# Patient Record
Sex: MALE | Race: OTHER | URBAN - METROPOLITAN AREA
[De-identification: names, ages, dates, MRNs, and addresses within clinical notes are randomized per-mention and may not be internally consistent; named-entity substitution may affect disease eponyms.]

---

## 2024-09-23 ENCOUNTER — APPOINTMENT (OUTPATIENT)
Dept: VASCULAR LAB | Age: 44
DRG: 549 | End: 2024-09-23

## 2024-09-23 ENCOUNTER — APPOINTMENT (OUTPATIENT)
Dept: GENERAL RADIOLOGY | Age: 44
DRG: 549 | End: 2024-09-23

## 2024-09-23 ENCOUNTER — HOSPITAL ENCOUNTER (INPATIENT)
Age: 44
LOS: 4 days | Discharge: HOME | DRG: 549 | End: 2024-09-27
Attending: EMERGENCY MEDICINE | Admitting: FAMILY MEDICINE

## 2024-09-23 DIAGNOSIS — F10.939 ALCOHOL WITHDRAWAL SYNDROME WITH COMPLICATION (HCC): ICD-10-CM

## 2024-09-23 DIAGNOSIS — M00.9 PYOGENIC ARTHRITIS OF LEFT KNEE JOINT, DUE TO UNSPECIFIED ORGANISM: Primary | ICD-10-CM

## 2024-09-23 PROBLEM — R03.0 ELEVATED BLOOD PRESSURE READING: Status: ACTIVE | Noted: 2024-09-23

## 2024-09-23 LAB
ANION GAP SERPL CALCULATED.3IONS-SCNC: 18 MMOL/L (ref 9–16)
BUN SERPL-MCNC: 14 MG/DL (ref 6–20)
CALCIUM SERPL-MCNC: 9.4 MG/DL (ref 8.6–10.4)
CHLORIDE SERPL-SCNC: 92 MMOL/L (ref 98–107)
CO2 SERPL-SCNC: 23 MMOL/L (ref 20–31)
CREAT SERPL-MCNC: 1.1 MG/DL (ref 0.7–1.2)
CRP SERPL HS-MCNC: 271 MG/L (ref 0–5)
D DIMER PPP FEU-MCNC: 6.43 UG/ML FEU (ref 0–0.57)
ERYTHROCYTE [DISTWIDTH] IN BLOOD BY AUTOMATED COUNT: 16.5 % (ref 11.8–14.4)
EST. AVERAGE GLUCOSE BLD GHB EST-MCNC: 128 MG/DL
GFR, ESTIMATED: 83 ML/MIN/1.73M2
GLUCOSE BLD-MCNC: 159 MG/DL (ref 75–110)
GLUCOSE BLD-MCNC: 172 MG/DL (ref 75–110)
GLUCOSE SERPL-MCNC: 199 MG/DL (ref 74–99)
HBA1C MFR BLD: 6.1 % (ref 4–6)
HCT VFR BLD AUTO: 41.2 % (ref 40.7–50.3)
HGB BLD-MCNC: 14.3 G/DL (ref 13–17)
LACTIC ACID, SEPSIS WHOLE BLOOD: 2.7 MMOL/L (ref 0.5–1.9)
MCH RBC QN AUTO: 32.1 PG (ref 25.2–33.5)
MCHC RBC AUTO-ENTMCNC: 34.7 G/DL (ref 28.4–34.8)
MCV RBC AUTO: 92.4 FL (ref 82.6–102.9)
NRBC BLD-RTO: 0 PER 100 WBC
PLATELET # BLD AUTO: 354 K/UL (ref 138–453)
PMV BLD AUTO: 9.7 FL (ref 8.1–13.5)
POTASSIUM SERPL-SCNC: 3.6 MMOL/L (ref 3.7–5.3)
RBC # BLD AUTO: 4.46 M/UL (ref 4.21–5.77)
SODIUM SERPL-SCNC: 133 MMOL/L (ref 136–145)
WBC OTHER # BLD: 15.4 K/UL (ref 3.5–11.3)

## 2024-09-23 PROCEDURE — 87186 SC STD MICRODIL/AGAR DIL: CPT

## 2024-09-23 PROCEDURE — 87077 CULTURE AEROBIC IDENTIFY: CPT

## 2024-09-23 PROCEDURE — 87205 SMEAR GRAM STAIN: CPT

## 2024-09-23 PROCEDURE — 89051 BODY FLUID CELL COUNT: CPT

## 2024-09-23 PROCEDURE — 99285 EMERGENCY DEPT VISIT HI MDM: CPT

## 2024-09-23 PROCEDURE — 85027 COMPLETE CBC AUTOMATED: CPT

## 2024-09-23 PROCEDURE — 89060 EXAM SYNOVIAL FLUID CRYSTALS: CPT

## 2024-09-23 PROCEDURE — 99222 1ST HOSP IP/OBS MODERATE 55: CPT | Performed by: STUDENT IN AN ORGANIZED HEALTH CARE EDUCATION/TRAINING PROGRAM

## 2024-09-23 PROCEDURE — 86140 C-REACTIVE PROTEIN: CPT

## 2024-09-23 PROCEDURE — 96365 THER/PROPH/DIAG IV INF INIT: CPT

## 2024-09-23 PROCEDURE — 83605 ASSAY OF LACTIC ACID: CPT

## 2024-09-23 PROCEDURE — 2580000003 HC RX 258: Performed by: STUDENT IN AN ORGANIZED HEALTH CARE EDUCATION/TRAINING PROGRAM

## 2024-09-23 PROCEDURE — 93005 ELECTROCARDIOGRAM TRACING: CPT | Performed by: FAMILY MEDICINE

## 2024-09-23 PROCEDURE — 82947 ASSAY GLUCOSE BLOOD QUANT: CPT

## 2024-09-23 PROCEDURE — 87591 N.GONORRHOEAE DNA AMP PROB: CPT

## 2024-09-23 PROCEDURE — 73562 X-RAY EXAM OF KNEE 3: CPT

## 2024-09-23 PROCEDURE — 85379 FIBRIN DEGRADATION QUANT: CPT

## 2024-09-23 PROCEDURE — 2500000003 HC RX 250 WO HCPCS

## 2024-09-23 PROCEDURE — 80048 BASIC METABOLIC PNL TOTAL CA: CPT

## 2024-09-23 PROCEDURE — 87070 CULTURE OTHR SPECIMN AEROBIC: CPT

## 2024-09-23 PROCEDURE — 1200000000 HC SEMI PRIVATE

## 2024-09-23 PROCEDURE — 6370000000 HC RX 637 (ALT 250 FOR IP): Performed by: STUDENT IN AN ORGANIZED HEALTH CARE EDUCATION/TRAINING PROGRAM

## 2024-09-23 PROCEDURE — 83036 HEMOGLOBIN GLYCOSYLATED A1C: CPT

## 2024-09-23 PROCEDURE — 87075 CULTR BACTERIA EXCEPT BLOOD: CPT

## 2024-09-23 PROCEDURE — 2580000003 HC RX 258

## 2024-09-23 PROCEDURE — 87491 CHLMYD TRACH DNA AMP PROBE: CPT

## 2024-09-23 PROCEDURE — 6360000002 HC RX W HCPCS

## 2024-09-23 PROCEDURE — 93970 EXTREMITY STUDY: CPT

## 2024-09-23 PROCEDURE — 96375 TX/PRO/DX INJ NEW DRUG ADDON: CPT

## 2024-09-23 PROCEDURE — 6360000002 HC RX W HCPCS: Performed by: STUDENT IN AN ORGANIZED HEALTH CARE EDUCATION/TRAINING PROGRAM

## 2024-09-23 RX ORDER — MAGNESIUM SULFATE IN WATER 40 MG/ML
2000 INJECTION, SOLUTION INTRAVENOUS PRN
Status: DISCONTINUED | OUTPATIENT
Start: 2024-09-23 | End: 2024-09-27 | Stop reason: HOSPADM

## 2024-09-23 RX ORDER — ENOXAPARIN SODIUM 100 MG/ML
30 INJECTION SUBCUTANEOUS 2 TIMES DAILY
Status: DISCONTINUED | OUTPATIENT
Start: 2024-09-23 | End: 2024-09-27 | Stop reason: HOSPADM

## 2024-09-23 RX ORDER — POTASSIUM CHLORIDE 7.45 MG/ML
10 INJECTION INTRAVENOUS PRN
Status: DISCONTINUED | OUTPATIENT
Start: 2024-09-23 | End: 2024-09-27 | Stop reason: HOSPADM

## 2024-09-23 RX ORDER — MORPHINE SULFATE 4 MG/ML
2 INJECTION INTRAVENOUS EVERY 4 HOURS PRN
Status: DISCONTINUED | OUTPATIENT
Start: 2024-09-23 | End: 2024-09-25

## 2024-09-23 RX ORDER — 0.9 % SODIUM CHLORIDE 0.9 %
30 INTRAVENOUS SOLUTION INTRAVENOUS ONCE
Status: COMPLETED | OUTPATIENT
Start: 2024-09-23 | End: 2024-09-23

## 2024-09-23 RX ORDER — POTASSIUM CHLORIDE 1500 MG/1
40 TABLET, EXTENDED RELEASE ORAL PRN
Status: DISCONTINUED | OUTPATIENT
Start: 2024-09-23 | End: 2024-09-27 | Stop reason: HOSPADM

## 2024-09-23 RX ORDER — SODIUM CHLORIDE 9 MG/ML
INJECTION, SOLUTION INTRAVENOUS CONTINUOUS
Status: ACTIVE | OUTPATIENT
Start: 2024-09-23 | End: 2024-09-25

## 2024-09-23 RX ORDER — POTASSIUM CHLORIDE 1500 MG/1
40 TABLET, EXTENDED RELEASE ORAL ONCE
Status: COMPLETED | OUTPATIENT
Start: 2024-09-23 | End: 2024-09-23

## 2024-09-23 RX ORDER — MORPHINE SULFATE 4 MG/ML
4 INJECTION INTRAVENOUS ONCE
Status: COMPLETED | OUTPATIENT
Start: 2024-09-23 | End: 2024-09-23

## 2024-09-23 RX ORDER — FENTANYL CITRATE 50 UG/ML
50 INJECTION, SOLUTION INTRAMUSCULAR; INTRAVENOUS ONCE
Status: COMPLETED | OUTPATIENT
Start: 2024-09-23 | End: 2024-09-23

## 2024-09-23 RX ORDER — PANTOPRAZOLE SODIUM 40 MG/1
40 TABLET, DELAYED RELEASE ORAL
Status: DISCONTINUED | OUTPATIENT
Start: 2024-09-24 | End: 2024-09-27 | Stop reason: HOSPADM

## 2024-09-23 RX ORDER — LIDOCAINE HYDROCHLORIDE AND EPINEPHRINE 10; 10 MG/ML; UG/ML
20 INJECTION, SOLUTION INFILTRATION; PERINEURAL ONCE
Status: COMPLETED | OUTPATIENT
Start: 2024-09-23 | End: 2024-09-23

## 2024-09-23 RX ORDER — SODIUM CHLORIDE 0.9 % (FLUSH) 0.9 %
5-40 SYRINGE (ML) INJECTION EVERY 12 HOURS SCHEDULED
Status: DISCONTINUED | OUTPATIENT
Start: 2024-09-23 | End: 2024-09-27 | Stop reason: HOSPADM

## 2024-09-23 RX ORDER — INSULIN LISPRO 100 [IU]/ML
0-8 INJECTION, SOLUTION INTRAVENOUS; SUBCUTANEOUS
Status: DISCONTINUED | OUTPATIENT
Start: 2024-09-23 | End: 2024-09-27 | Stop reason: HOSPADM

## 2024-09-23 RX ORDER — ONDANSETRON 2 MG/ML
4 INJECTION INTRAMUSCULAR; INTRAVENOUS EVERY 6 HOURS PRN
Status: DISCONTINUED | OUTPATIENT
Start: 2024-09-23 | End: 2024-09-27 | Stop reason: HOSPADM

## 2024-09-23 RX ORDER — ACETAMINOPHEN 325 MG/1
650 TABLET ORAL EVERY 6 HOURS PRN
Status: DISCONTINUED | OUTPATIENT
Start: 2024-09-23 | End: 2024-09-27 | Stop reason: HOSPADM

## 2024-09-23 RX ORDER — ACETAMINOPHEN 650 MG/1
650 SUPPOSITORY RECTAL EVERY 6 HOURS PRN
Status: DISCONTINUED | OUTPATIENT
Start: 2024-09-23 | End: 2024-09-27 | Stop reason: HOSPADM

## 2024-09-23 RX ORDER — INSULIN LISPRO 100 [IU]/ML
0-4 INJECTION, SOLUTION INTRAVENOUS; SUBCUTANEOUS NIGHTLY
Status: DISCONTINUED | OUTPATIENT
Start: 2024-09-23 | End: 2024-09-27 | Stop reason: HOSPADM

## 2024-09-23 RX ORDER — POLYETHYLENE GLYCOL 3350 17 G/17G
17 POWDER, FOR SOLUTION ORAL DAILY PRN
Status: DISCONTINUED | OUTPATIENT
Start: 2024-09-23 | End: 2024-09-27 | Stop reason: HOSPADM

## 2024-09-23 RX ORDER — ONDANSETRON 4 MG/1
4 TABLET, ORALLY DISINTEGRATING ORAL EVERY 8 HOURS PRN
Status: DISCONTINUED | OUTPATIENT
Start: 2024-09-23 | End: 2024-09-27 | Stop reason: HOSPADM

## 2024-09-23 RX ORDER — SODIUM CHLORIDE 9 MG/ML
INJECTION, SOLUTION INTRAVENOUS PRN
Status: DISCONTINUED | OUTPATIENT
Start: 2024-09-23 | End: 2024-09-27 | Stop reason: HOSPADM

## 2024-09-23 RX ORDER — KETOROLAC TROMETHAMINE 15 MG/ML
15 INJECTION, SOLUTION INTRAMUSCULAR; INTRAVENOUS ONCE
Status: COMPLETED | OUTPATIENT
Start: 2024-09-23 | End: 2024-09-23

## 2024-09-23 RX ORDER — SODIUM CHLORIDE 0.9 % (FLUSH) 0.9 %
5-40 SYRINGE (ML) INJECTION PRN
Status: DISCONTINUED | OUTPATIENT
Start: 2024-09-23 | End: 2024-09-27 | Stop reason: HOSPADM

## 2024-09-23 RX ADMIN — POTASSIUM CHLORIDE 40 MEQ: 1500 TABLET, EXTENDED RELEASE ORAL at 14:40

## 2024-09-23 RX ADMIN — LIDOCAINE HYDROCHLORIDE AND EPINEPHRINE 20 ML: 10; 10 INJECTION, SOLUTION INFILTRATION; PERINEURAL at 11:39

## 2024-09-23 RX ADMIN — SODIUM CHLORIDE: 9 INJECTION, SOLUTION INTRAVENOUS at 15:05

## 2024-09-23 RX ADMIN — SODIUM CHLORIDE 3402 ML: 9 INJECTION, SOLUTION INTRAVENOUS at 12:01

## 2024-09-23 RX ADMIN — FENTANYL CITRATE 50 MCG: 50 INJECTION, SOLUTION INTRAMUSCULAR; INTRAVENOUS at 11:24

## 2024-09-23 RX ADMIN — SODIUM CHLORIDE: 9 INJECTION, SOLUTION INTRAVENOUS at 20:48

## 2024-09-23 RX ADMIN — KETOROLAC TROMETHAMINE 15 MG: 15 INJECTION, SOLUTION INTRAMUSCULAR; INTRAVENOUS at 11:24

## 2024-09-23 RX ADMIN — CEFTRIAXONE SODIUM 1000 MG: 1 INJECTION, POWDER, FOR SOLUTION INTRAMUSCULAR; INTRAVENOUS at 12:00

## 2024-09-23 RX ADMIN — MORPHINE SULFATE 4 MG: 4 INJECTION INTRAVENOUS at 13:13

## 2024-09-23 RX ADMIN — MORPHINE SULFATE 2 MG: 4 INJECTION INTRAVENOUS at 16:29

## 2024-09-23 RX ADMIN — SODIUM CHLORIDE 1500 MG: 9 INJECTION, SOLUTION INTRAVENOUS at 13:13

## 2024-09-23 ASSESSMENT — PAIN SCALES - GENERAL
PAINLEVEL_OUTOF10: 10
PAINLEVEL_OUTOF10: 0
PAINLEVEL_OUTOF10: 7
PAINLEVEL_OUTOF10: 10

## 2024-09-23 ASSESSMENT — PAIN DESCRIPTION - DESCRIPTORS: DESCRIPTORS: DISCOMFORT;NAGGING;DULL

## 2024-09-23 ASSESSMENT — PAIN DESCRIPTION - ORIENTATION: ORIENTATION: LEFT

## 2024-09-23 ASSESSMENT — ENCOUNTER SYMPTOMS
RESPIRATORY NEGATIVE: 1
ABDOMINAL PAIN: 1
NAUSEA: 1
VOMITING: 1
DIARRHEA: 1

## 2024-09-23 ASSESSMENT — PAIN DESCRIPTION - LOCATION: LOCATION: KNEE

## 2024-09-23 NOTE — ED PROVIDER NOTES
[facility] ED     Emergency Department     Faculty Attestation        I performed a history and physical examination of the patient and discussed management with the resident. I reviewed the resident’s note and agree with the documented findings and plan of care. Any areas of disagreement are noted on the chart. I was personally present for the key portions of any procedures. I have documented in the chart those procedures where I was not present during the key portions. I have reviewed the emergency nurses triage note. I agree with the chief complaint, past medical history, past surgical history, allergies, medications, social and family history as documented unless otherwise noted below.    For mid-level providers such as nurse practitioners as well as physicians assistants:    I have personally seen and evaluated the patient.    I find the patient's history and physical exam are consistent with NP/PA documentation.  I agree with the care provided, treatment rendered, disposition, & follow-up plan.     Additional findings are as noted.    Vital Signs: Pulse (!) 105   Temp 98.8 °F (37.1 °C)   Resp 29   Wt 113.4 kg (250 lb)   SpO2 98%   PCP:  No primary care provider on file.    Pertinent Comments:     Patient is a fevers and chills for the past couple days with left knee pain.  He denies any falls or trauma to the left knee does have a history of gout but states he is never had it in his knee.  He is afebrile and nontoxic here but his left knee is diffusely swollen and warm with limited range of motion.  Concern for possible septic joint obtain labs, arthrocentesis, pain control, reassessment      Critical Care  None          Don Morris MD    Attending Emergency Medicine Physician            Luis Morris MD  09/23/24 [id number]

## 2024-09-23 NOTE — ED NOTES
Patient presents to the ED via EMS. Patient states that he ate a chinese buffet on Friday and has been experiencing crippling leg pain since. Per patient the pain has kept him bed bound since Friday.  Patient is in NAD, respirations are even and unlabored, bed is in lowest position and call light within reach. Patient was placed on bedside monitor, IV established, and EKG completed. Resident is bedside for evaluation. Writer will continue with plan of care.

## 2024-09-23 NOTE — CONSULTS
Orthopaedic Surgery Consult  (Dr. Hodgson)      CC/Reason for consult: left septic knee arthritis    HPI:      The patient is a 44 y.o. right hand-dominant male who presents emergency department with severe left knee pain.  Patient states left knee pain started on Friday became progressively worse on Saturday to the point of being unable to walk Saturday night.  All day Sunday he remained off of the knee however continued to have progressive pain.  Due to the significant pain Monday morning he came to the emergency department for further evaluation.  Upon evaluation by the emergency department there was a notable effusion which was aspirated and sent for culture.  On gross examination of the synovial fluid sample GPC P was noted.  Cell count differential demonstrated 203k white blood cells.  Orthopedic surgery was consulted for further recommendations.    Patient seen and evaluated emergency department resting comfortably with family in the room.  He states all of his pain is located in his left knee.  He denies prior injuries to the left knee or open wounds about his body.  Denies prior illnesses.  States that he has had fevers and chills over the past 2 days.  Denies other joint pain at this time.  At baseline patient is a community ambulator.  He is currently unemployed.    Past Medical History:    No past medical history on file.  Past Surgical History:    No past surgical history on file.  Medications Prior to Admission:   Prior to Admission medications    Not on File     Allergies:    Patient has no known allergies.  Social History:   Social History     Socioeconomic History    Marital status: Unknown     Social Determinants of Health      Received from [facility]     Family History:  No family history on file.    ROS:   Constitutional: Positive for fever and chills.   Respiratory: Negative for cough.    Cardiovascular: Negative for chest pain.   Musculoskeletal:  Positive for arthralgias myalgias left knee.   Skin: Negative for itching and rash.   Neurological: Negative for numbness, tingling, weakness.     PE:  Blood pressure (!) 155/90, pulse 78, temperature 98.5 °F (36.9 °C), temperature source Oral, resp. rate 20, height 1.829 m (6'), weight 113.4 kg (250 lb), SpO2 99%.    Gen: Alert and mildly confused however redirectable, NAD, cooperative.     Head: Normocephalic, atraumatic.    Cardiovascular: Regular rate.    Respiratory: Chest symmetric, no accessory muscle use.    LLE: Skin intact.  Significant effusion noted about the left knee.  Knee resting in slightly flexed position.  Severe pain with any amount of palpation or movement about the left knee.  Compartments soft and easily compressible. EHL/FHL/TA/GS complex motor intact. Sural/saphenous/SPN/DPN/plantar nerve distribution SILT. Patient has no groin pain with log roll maneuver.  Foot is warm and well-perfused with BCR.    Labs:  Recent Labs     09/23/24  0930   WBC 15.4*   HGB 14.3   HCT 41.2      *   K 3.6*   BUN 14   CREATININE 1.1   GLUCOSE 199*   .0*        Imaging:   Multiple radiographic views of the left knee AP, oblique, lateral demonstrating a significant intra-articular effusion.  No evidence of acute fracture or dislocation.    Assessment: 44 y.o. male being seen for:    -Left knee septic arthritis      Plan:      -Plan for OR 9/24 with Dr. Hodgson for left knee irrigation debridement   -WB status: Weightbearing as tolerated left lower extremity  -Diet: Patient will be n.p.o. on 9/24 at 0015  -abx/tetanus: Currently on vancomycin, cultures pending from synovial fluid analysis  -consent/marked  -Pre-op labs  -F/u CRP, ESR  -F/u VitD level  -Pain control per primary team discretion  -Ice and elevate extremity for pain and swelling  -Please contact ortho with any questions      Robert Cruz MD  Orthopedic Surgery Resident, PGY-3  [facility],  [place]  6:32 PM 9/23/2024    This note is created with the assistance of a speech recognition program. While intending to generate a document that actually reflects the content of the visit, the document can still have some errors including those of syntax and sound a like substitutions which may escape proof reading.  In such instances, actual meaning can be extrapolated by contextual diversion.

## 2024-09-23 NOTE — H&P
[email or web address]    [facility]   IN-PATIENT SERVICE   [facility]    HISTORY AND PHYSICAL EXAMINATION            Date:   9/23/2024  Patient name:  Elizabet Wolff  Date of admission:  9/23/2024  9:19 AM  MRN:   [id number]  Account:  [id number]  YOB: 1980  PCP:    No primary care provider on file.  Room:   [facility]  Code Status:    Full Code    Chief Complaint:     Chief Complaint   Patient presents with    Leg Pain       History Obtained From:     patient, electronic medical record    History of Present Illness:     Elizabet Wolff is a 44 y.o. Unavailable / unknown male who presents with Leg Pain   and is admitted to the hospital for the management of Septic arthritis (HCC).    44-year-old male with history of diabetes mellitus not on any medication at home, came in with left knee pain and swelling going on for 3 days , fever, chills, GI symptoms, patient states that he came to the hospital because he thought he had food poisoning because since Saturday he has been having abdominal discomfort, vomiting, diarrhea however he did notice the left knee swelling/pain which worsened and now has been having difficulty with ambulation , currently his GI symptoms have resolved completely, found to be afebrile on arrival although tachycardic and tachypneic on arrival, labs showed elevated WBC, lactic acid count, CRP, D-dimer, elevated glucose levels  pending lower extremity Doppler studies,  Status post aspiration of left knee joint, labs sent from the ER, orthopedic surgery was consulted as well, patient received dose of antibiotic in ER      Past Medical History:     No past medical history on file.     Past Surgical History:     No past surgical history on file.     Medications Prior to Admission:     Prior to Admission medications    Not on File        Allergies:     Patient has no known allergies.    Social History:     Tobacco:    has no history on file for tobacco use.  Alcohol:

## 2024-09-23 NOTE — PROGRESS NOTES
[facility]   Pharmacy Pharmacokinetic Monitoring Service - Vancomycin     Elizabet Wolff is a 44 y.o. male starting on vancomycin therapy for bone/joint infection. Pharmacy consulted by Dr Mari for monitoring and adjustment.    Target Concentration: Goal AUC/XOCHILT 400-600 mg*hr/L    Additional Antimicrobials: ceftriaxone    Pertinent Laboratory Values:   Wt Readings from Last 1 Encounters:   09/23/24 113.4 kg (250 lb)     Temp Readings from Last 1 Encounters:   09/23/24 98.8 °F (37.1 °C)     Estimated Creatinine Clearance: 110 mL/min (based on SCr of 1.1 mg/dL).  Recent Labs     09/23/24  0930   CREATININE 1.1   BUN 14   WBC 15.4*     Procalcitonin: na    Pertinent Cultures:  Culture Date Source Results   9/23 Synovial fluid pending   MRSA Nasal Swab: N/A. Non-respiratory infection.    Plan:  Dosing recommendations based on Bayesian software  Start vancomycin 1500 mg IVPB q12h  Anticipated AUC of 490 and trough concentration of 14 at steady state  Renal labs as indicated   Vancomycin concentration not ordered  Pharmacy will continue to monitor patient and adjust therapy as indicated    Thank you for the consult,  Barbie Mckeon RPH  9/23/2024 12:26 PM

## 2024-09-24 ENCOUNTER — ANESTHESIA (OUTPATIENT)
Dept: OPERATING ROOM | Age: 44
End: 2024-09-24

## 2024-09-24 ENCOUNTER — ANESTHESIA EVENT (OUTPATIENT)
Dept: OPERATING ROOM | Age: 44
End: 2024-09-24

## 2024-09-24 LAB
ANION GAP SERPL CALCULATED.3IONS-SCNC: 12 MMOL/L (ref 9–16)
APPEARANCE: ABNORMAL
BASOPHILS # BLD: 0 %
BASOPHILS # BLD: 0 K/UL (ref 0–0.2)
BASOPHILS NFR BLD: 0 % (ref 0–2)
BODY FLD TYPE: ABNORMAL
BUN SERPL-MCNC: 22 MG/DL (ref 6–20)
CALCIUM SERPL-MCNC: 8.4 MG/DL (ref 8.6–10.4)
CHLAMYDIA DNA UR QL NAA+PROBE: NEGATIVE
CHLORIDE SERPL-SCNC: 101 MMOL/L (ref 98–107)
CHOLEST SERPL-MCNC: 134 MG/DL (ref 0–199)
CHOLESTEROL/HDL RATIO: 2
CLOT CHECK: ABNORMAL
CO2 SERPL-SCNC: 22 MMOL/L (ref 20–31)
COLOR FLUID: YELLOW
CREAT SERPL-MCNC: 1.1 MG/DL (ref 0.7–1.2)
CRYSTALS FLD MICRO: NEGATIVE
EOSINOPHIL # BLD: 0 K/UL (ref 0–0.44)
EOSINOPHIL # BLD: 1 %
EOSINOPHILS RELATIVE PERCENT: 0 % (ref 1–4)
ERYTHROCYTE [DISTWIDTH] IN BLOOD BY AUTOMATED COUNT: 16.5 % (ref 11.8–14.4)
EST. AVERAGE GLUCOSE BLD GHB EST-MCNC: 128 MG/DL
GFR, ESTIMATED: 82 ML/MIN/1.73M2
GLUCOSE BLD-MCNC: 144 MG/DL (ref 75–110)
GLUCOSE BLD-MCNC: 145 MG/DL (ref 75–110)
GLUCOSE BLD-MCNC: 173 MG/DL (ref 75–110)
GLUCOSE BLD-MCNC: 180 MG/DL (ref 75–110)
GLUCOSE BLD-MCNC: 255 MG/DL (ref 75–110)
GLUCOSE SERPL-MCNC: 152 MG/DL (ref 74–99)
HBA1C MFR BLD: 6.1 % (ref 4–6)
HCT VFR BLD AUTO: 38 % (ref 40.7–50.3)
HDLC SERPL-MCNC: 61 MG/DL
HGB BLD-MCNC: 13.3 G/DL (ref 13–17)
IMM GRANULOCYTES # BLD AUTO: 0 K/UL (ref 0–0.3)
IMM GRANULOCYTES NFR BLD: 0 %
LACTIC ACID, WHOLE BLOOD: 1.4 MMOL/L (ref 0.7–2.1)
LDLC SERPL CALC-MCNC: 50 MG/DL (ref 0–100)
LYMPHOCYTE, SYNOVIAL FLUID: 18 %
LYMPHOCYTES NFR BLD: 0.86 K/UL (ref 1.1–3.7)
LYMPHOCYTES RELATIVE PERCENT: 6 % (ref 24–43)
MCH RBC QN AUTO: 32.6 PG (ref 25.2–33.5)
MCHC RBC AUTO-ENTMCNC: 35 G/DL (ref 28.4–34.8)
MCV RBC AUTO: 93.1 FL (ref 82.6–102.9)
MONO/MACROPHAGE FLUID: 10 %
MONOCYTES NFR BLD: 0.86 K/UL (ref 0.1–1.2)
MONOCYTES NFR BLD: 6 % (ref 3–12)
MORPHOLOGY: ABNORMAL
MORPHOLOGY: ABNORMAL
N GONORRHOEA DNA UR QL NAA+PROBE: NEGATIVE
NEUTROPHIL, FLUID: 71 %
NEUTROPHILS NFR BLD: 88 % (ref 36–65)
NEUTS SEG NFR BLD: 12.58 K/UL (ref 1.5–8.1)
NRBC BLD-RTO: 0 PER 100 WBC
PATH INTERP FLD-IMP: NORMAL
PLATELET # BLD AUTO: 315 K/UL (ref 138–453)
PMV BLD AUTO: 9.8 FL (ref 8.1–13.5)
POTASSIUM SERPL-SCNC: 3.8 MMOL/L (ref 3.7–5.3)
RBC # BLD AUTO: 4.08 M/UL (ref 4.21–5.77)
RBC, SYNOVIAL FLUID: ABNORMAL CELLS/UL
SODIUM SERPL-SCNC: 135 MMOL/L (ref 136–145)
SPECIMEN DESCRIPTION: NORMAL
SPECIMEN TYPE: NORMAL
TOTAL NUCLEATED CELLS SYNOVIAL: ABNORMAL CELLS/UL
TRIGL SERPL-MCNC: 115 MG/DL
VLDLC SERPL CALC-MCNC: 23 MG/DL
WBC OTHER # BLD: 14.3 K/UL (ref 3.5–11.3)

## 2024-09-24 PROCEDURE — 80061 LIPID PANEL: CPT

## 2024-09-24 PROCEDURE — 7100000000 HC PACU RECOVERY - FIRST 15 MIN: Performed by: STUDENT IN AN ORGANIZED HEALTH CARE EDUCATION/TRAINING PROGRAM

## 2024-09-24 PROCEDURE — 2500000003 HC RX 250 WO HCPCS

## 2024-09-24 PROCEDURE — 80048 BASIC METABOLIC PNL TOTAL CA: CPT

## 2024-09-24 PROCEDURE — 6370000000 HC RX 637 (ALT 250 FOR IP)

## 2024-09-24 PROCEDURE — 0S9D4ZZ DRAINAGE OF LEFT KNEE JOINT, PERCUTANEOUS ENDOSCOPIC APPROACH: ICD-10-PCS | Performed by: STUDENT IN AN ORGANIZED HEALTH CARE EDUCATION/TRAINING PROGRAM

## 2024-09-24 PROCEDURE — 2580000003 HC RX 258: Performed by: STUDENT IN AN ORGANIZED HEALTH CARE EDUCATION/TRAINING PROGRAM

## 2024-09-24 PROCEDURE — 6360000002 HC RX W HCPCS: Performed by: FAMILY MEDICINE

## 2024-09-24 PROCEDURE — 82947 ASSAY GLUCOSE BLOOD QUANT: CPT

## 2024-09-24 PROCEDURE — 2709999900 HC NON-CHARGEABLE SUPPLY: Performed by: STUDENT IN AN ORGANIZED HEALTH CARE EDUCATION/TRAINING PROGRAM

## 2024-09-24 PROCEDURE — 2580000003 HC RX 258

## 2024-09-24 PROCEDURE — 6360000002 HC RX W HCPCS: Performed by: ANESTHESIOLOGY

## 2024-09-24 PROCEDURE — 7100000001 HC PACU RECOVERY - ADDTL 15 MIN: Performed by: STUDENT IN AN ORGANIZED HEALTH CARE EDUCATION/TRAINING PROGRAM

## 2024-09-24 PROCEDURE — 87070 CULTURE OTHR SPECIMN AEROBIC: CPT

## 2024-09-24 PROCEDURE — 93970 EXTREMITY STUDY: CPT | Performed by: STUDENT IN AN ORGANIZED HEALTH CARE EDUCATION/TRAINING PROGRAM

## 2024-09-24 PROCEDURE — 1200000000 HC SEMI PRIVATE

## 2024-09-24 PROCEDURE — 3600000014 HC SURGERY LEVEL 4 ADDTL 15MIN: Performed by: STUDENT IN AN ORGANIZED HEALTH CARE EDUCATION/TRAINING PROGRAM

## 2024-09-24 PROCEDURE — 6360000002 HC RX W HCPCS

## 2024-09-24 PROCEDURE — 0SBD0ZZ EXCISION OF LEFT KNEE JOINT, OPEN APPROACH: ICD-10-PCS | Performed by: STUDENT IN AN ORGANIZED HEALTH CARE EDUCATION/TRAINING PROGRAM

## 2024-09-24 PROCEDURE — 99232 SBSQ HOSP IP/OBS MODERATE 35: CPT | Performed by: FAMILY MEDICINE

## 2024-09-24 PROCEDURE — 83036 HEMOGLOBIN GLYCOSYLATED A1C: CPT

## 2024-09-24 PROCEDURE — 85025 COMPLETE CBC W/AUTO DIFF WBC: CPT

## 2024-09-24 PROCEDURE — 3700000000 HC ANESTHESIA ATTENDED CARE: Performed by: STUDENT IN AN ORGANIZED HEALTH CARE EDUCATION/TRAINING PROGRAM

## 2024-09-24 PROCEDURE — 87102 FUNGUS ISOLATION CULTURE: CPT

## 2024-09-24 PROCEDURE — 87205 SMEAR GRAM STAIN: CPT

## 2024-09-24 PROCEDURE — 83605 ASSAY OF LACTIC ACID: CPT

## 2024-09-24 PROCEDURE — 2580000003 HC RX 258: Performed by: ANESTHESIOLOGY

## 2024-09-24 PROCEDURE — 87206 SMEAR FLUORESCENT/ACID STAI: CPT

## 2024-09-24 PROCEDURE — 87075 CULTR BACTERIA EXCEPT BLOOD: CPT

## 2024-09-24 PROCEDURE — 3600000004 HC SURGERY LEVEL 4 BASE: Performed by: STUDENT IN AN ORGANIZED HEALTH CARE EDUCATION/TRAINING PROGRAM

## 2024-09-24 PROCEDURE — 99222 1ST HOSP IP/OBS MODERATE 55: CPT | Performed by: STUDENT IN AN ORGANIZED HEALTH CARE EDUCATION/TRAINING PROGRAM

## 2024-09-24 PROCEDURE — 3700000001 HC ADD 15 MINUTES (ANESTHESIA): Performed by: STUDENT IN AN ORGANIZED HEALTH CARE EDUCATION/TRAINING PROGRAM

## 2024-09-24 PROCEDURE — 29871 ARTHRS KNEE SURG FOR INFCTJ: CPT | Performed by: STUDENT IN AN ORGANIZED HEALTH CARE EDUCATION/TRAINING PROGRAM

## 2024-09-24 PROCEDURE — 87077 CULTURE AEROBIC IDENTIFY: CPT

## 2024-09-24 RX ORDER — MAGNESIUM HYDROXIDE 1200 MG/15ML
LIQUID ORAL CONTINUOUS PRN
Status: DISCONTINUED | OUTPATIENT
Start: 2024-09-24 | End: 2024-09-24 | Stop reason: HOSPADM

## 2024-09-24 RX ORDER — ONDANSETRON 2 MG/ML
INJECTION INTRAMUSCULAR; INTRAVENOUS
Status: DISCONTINUED | OUTPATIENT
Start: 2024-09-24 | End: 2024-09-24 | Stop reason: SDUPTHER

## 2024-09-24 RX ORDER — SODIUM CHLORIDE 0.9 % (FLUSH) 0.9 %
5-40 SYRINGE (ML) INJECTION PRN
Status: DISCONTINUED | OUTPATIENT
Start: 2024-09-24 | End: 2024-09-27 | Stop reason: HOSPADM

## 2024-09-24 RX ORDER — LABETALOL HYDROCHLORIDE 5 MG/ML
10 INJECTION, SOLUTION INTRAVENOUS
Status: DISCONTINUED | OUTPATIENT
Start: 2024-09-24 | End: 2024-09-27 | Stop reason: HOSPADM

## 2024-09-24 RX ORDER — SODIUM CHLORIDE, SODIUM LACTATE, POTASSIUM CHLORIDE, CALCIUM CHLORIDE 600; 310; 30; 20 MG/100ML; MG/100ML; MG/100ML; MG/100ML
INJECTION, SOLUTION INTRAVENOUS
Status: DISCONTINUED | OUTPATIENT
Start: 2024-09-24 | End: 2024-09-24 | Stop reason: SDUPTHER

## 2024-09-24 RX ORDER — SODIUM CHLORIDE 0.9 % (FLUSH) 0.9 %
5-40 SYRINGE (ML) INJECTION EVERY 12 HOURS SCHEDULED
Status: DISCONTINUED | OUTPATIENT
Start: 2024-09-24 | End: 2024-09-27 | Stop reason: HOSPADM

## 2024-09-24 RX ORDER — IPRATROPIUM BROMIDE AND ALBUTEROL SULFATE 2.5; .5 MG/3ML; MG/3ML
1 SOLUTION RESPIRATORY (INHALATION)
Status: DISCONTINUED | OUTPATIENT
Start: 2024-09-24 | End: 2024-09-25

## 2024-09-24 RX ORDER — LIDOCAINE HYDROCHLORIDE 10 MG/ML
INJECTION, SOLUTION EPIDURAL; INFILTRATION; INTRACAUDAL; PERINEURAL
Status: DISCONTINUED | OUTPATIENT
Start: 2024-09-24 | End: 2024-09-24 | Stop reason: SDUPTHER

## 2024-09-24 RX ORDER — MIDAZOLAM HYDROCHLORIDE 1 MG/ML
INJECTION INTRAMUSCULAR; INTRAVENOUS
Status: DISCONTINUED | OUTPATIENT
Start: 2024-09-24 | End: 2024-09-24 | Stop reason: SDUPTHER

## 2024-09-24 RX ORDER — ROCURONIUM BROMIDE 10 MG/ML
INJECTION, SOLUTION INTRAVENOUS
Status: DISCONTINUED | OUTPATIENT
Start: 2024-09-24 | End: 2024-09-24 | Stop reason: SDUPTHER

## 2024-09-24 RX ORDER — NALOXONE HYDROCHLORIDE 0.4 MG/ML
INJECTION, SOLUTION INTRAMUSCULAR; INTRAVENOUS; SUBCUTANEOUS PRN
Status: DISCONTINUED | OUTPATIENT
Start: 2024-09-24 | End: 2024-09-27 | Stop reason: HOSPADM

## 2024-09-24 RX ORDER — PROPOFOL 10 MG/ML
INJECTION, EMULSION INTRAVENOUS
Status: DISCONTINUED | OUTPATIENT
Start: 2024-09-24 | End: 2024-09-24 | Stop reason: SDUPTHER

## 2024-09-24 RX ORDER — SODIUM CHLORIDE 9 MG/ML
INJECTION, SOLUTION INTRAVENOUS PRN
Status: DISCONTINUED | OUTPATIENT
Start: 2024-09-24 | End: 2024-09-27 | Stop reason: HOSPADM

## 2024-09-24 RX ORDER — DIPHENHYDRAMINE HYDROCHLORIDE 50 MG/ML
12.5 INJECTION INTRAMUSCULAR; INTRAVENOUS
Status: DISCONTINUED | OUTPATIENT
Start: 2024-09-24 | End: 2024-09-25

## 2024-09-24 RX ORDER — PROCHLORPERAZINE EDISYLATE 5 MG/ML
5 INJECTION INTRAMUSCULAR; INTRAVENOUS
Status: COMPLETED | OUTPATIENT
Start: 2024-09-24 | End: 2024-09-25

## 2024-09-24 RX ORDER — SUCCINYLCHOLINE/SOD CL,ISO/PF 100 MG/5ML
SYRINGE (ML) INTRAVENOUS
Status: DISCONTINUED | OUTPATIENT
Start: 2024-09-24 | End: 2024-09-24 | Stop reason: SDUPTHER

## 2024-09-24 RX ORDER — FENTANYL CITRATE 50 UG/ML
INJECTION, SOLUTION INTRAMUSCULAR; INTRAVENOUS
Status: DISCONTINUED | OUTPATIENT
Start: 2024-09-24 | End: 2024-09-24 | Stop reason: SDUPTHER

## 2024-09-24 RX ORDER — DEXAMETHASONE SODIUM PHOSPHATE 10 MG/ML
INJECTION, SOLUTION INTRAMUSCULAR; INTRAVENOUS
Status: DISCONTINUED | OUTPATIENT
Start: 2024-09-24 | End: 2024-09-24 | Stop reason: SDUPTHER

## 2024-09-24 RX ORDER — HYDRALAZINE HYDROCHLORIDE 20 MG/ML
10 INJECTION INTRAMUSCULAR; INTRAVENOUS
Status: DISCONTINUED | OUTPATIENT
Start: 2024-09-24 | End: 2024-09-27 | Stop reason: HOSPADM

## 2024-09-24 RX ORDER — ONDANSETRON 2 MG/ML
4 INJECTION INTRAMUSCULAR; INTRAVENOUS EVERY 6 HOURS PRN
Status: DISCONTINUED | OUTPATIENT
Start: 2024-09-24 | End: 2024-09-27 | Stop reason: HOSPADM

## 2024-09-24 RX ORDER — CEFAZOLIN SODIUM 1 G/3ML
INJECTION, POWDER, FOR SOLUTION INTRAMUSCULAR; INTRAVENOUS
Status: DISCONTINUED | OUTPATIENT
Start: 2024-09-24 | End: 2024-09-24 | Stop reason: SDUPTHER

## 2024-09-24 RX ADMIN — HYDROMORPHONE HYDROCHLORIDE 0.25 MG: 1 INJECTION, SOLUTION INTRAMUSCULAR; INTRAVENOUS; SUBCUTANEOUS at 20:40

## 2024-09-24 RX ADMIN — SODIUM CHLORIDE, POTASSIUM CHLORIDE, SODIUM LACTATE AND CALCIUM CHLORIDE: 600; 310; 30; 20 INJECTION, SOLUTION INTRAVENOUS at 07:25

## 2024-09-24 RX ADMIN — ENOXAPARIN SODIUM 30 MG: 100 INJECTION SUBCUTANEOUS at 20:40

## 2024-09-24 RX ADMIN — SODIUM CHLORIDE, POTASSIUM CHLORIDE, SODIUM LACTATE AND CALCIUM CHLORIDE: 600; 310; 30; 20 INJECTION, SOLUTION INTRAVENOUS at 08:56

## 2024-09-24 RX ADMIN — SODIUM CHLORIDE 1500 MG: 9 INJECTION, SOLUTION INTRAVENOUS at 00:15

## 2024-09-24 RX ADMIN — SODIUM CHLORIDE, SODIUM LACTATE, POTASSIUM CHLORIDE, CALCIUM CHLORIDE: 600; 310; 30; 20 INJECTION, SOLUTION INTRAVENOUS at 07:45

## 2024-09-24 RX ADMIN — ONDANSETRON 4 MG: 2 INJECTION INTRAMUSCULAR; INTRAVENOUS at 06:46

## 2024-09-24 RX ADMIN — FENTANYL CITRATE 50 MCG: 50 INJECTION, SOLUTION INTRAMUSCULAR; INTRAVENOUS at 08:51

## 2024-09-24 RX ADMIN — SODIUM CHLORIDE, PRESERVATIVE FREE 10 ML: 5 INJECTION INTRAVENOUS at 10:00

## 2024-09-24 RX ADMIN — CEFAZOLIN 2 G: 1 INJECTION, POWDER, FOR SOLUTION INTRAMUSCULAR; INTRAVENOUS at 07:55

## 2024-09-24 RX ADMIN — ROCURONIUM BROMIDE 10 MG: 10 INJECTION, SOLUTION INTRAVENOUS at 07:39

## 2024-09-24 RX ADMIN — FENTANYL CITRATE 50 MCG: 50 INJECTION, SOLUTION INTRAMUSCULAR; INTRAVENOUS at 07:39

## 2024-09-24 RX ADMIN — SODIUM CHLORIDE, POTASSIUM CHLORIDE, SODIUM LACTATE AND CALCIUM CHLORIDE: 600; 310; 30; 20 INJECTION, SOLUTION INTRAVENOUS at 08:14

## 2024-09-24 RX ADMIN — SUGAMMADEX 300 MG: 100 INJECTION, SOLUTION INTRAVENOUS at 08:49

## 2024-09-24 RX ADMIN — ONDANSETRON 4 MG: 2 INJECTION INTRAMUSCULAR; INTRAVENOUS at 08:41

## 2024-09-24 RX ADMIN — SODIUM CHLORIDE 1500 MG: 9 INJECTION, SOLUTION INTRAVENOUS at 12:29

## 2024-09-24 RX ADMIN — ROCURONIUM BROMIDE 40 MG: 10 INJECTION, SOLUTION INTRAVENOUS at 07:48

## 2024-09-24 RX ADMIN — FENTANYL CITRATE 50 MCG: 50 INJECTION, SOLUTION INTRAMUSCULAR; INTRAVENOUS at 07:28

## 2024-09-24 RX ADMIN — LIDOCAINE HYDROCHLORIDE 50 MG: 10 INJECTION, SOLUTION EPIDURAL; INFILTRATION; INTRACAUDAL; PERINEURAL at 07:39

## 2024-09-24 RX ADMIN — MIDAZOLAM 2 MG: 1 INJECTION INTRAMUSCULAR; INTRAVENOUS at 07:27

## 2024-09-24 RX ADMIN — FENTANYL CITRATE 50 MCG: 50 INJECTION, SOLUTION INTRAMUSCULAR; INTRAVENOUS at 08:39

## 2024-09-24 RX ADMIN — DEXAMETHASONE SODIUM PHOSPHATE 10 MG: 10 INJECTION INTRAMUSCULAR; INTRAVENOUS at 07:47

## 2024-09-24 RX ADMIN — Medication 100 MG: at 07:39

## 2024-09-24 RX ADMIN — Medication 2000 MG: at 17:00

## 2024-09-24 RX ADMIN — PROPOFOL 200 MG: 10 INJECTION, EMULSION INTRAVENOUS at 07:39

## 2024-09-24 RX ADMIN — HYDROMORPHONE HYDROCHLORIDE 0.5 MG: 1 INJECTION, SOLUTION INTRAMUSCULAR; INTRAVENOUS; SUBCUTANEOUS at 09:16

## 2024-09-24 RX ADMIN — SODIUM CHLORIDE: 9 INJECTION, SOLUTION INTRAVENOUS at 15:01

## 2024-09-24 RX ADMIN — INSULIN LISPRO 4 UNITS: 100 INJECTION, SOLUTION INTRAVENOUS; SUBCUTANEOUS at 16:59

## 2024-09-24 RX ADMIN — SODIUM CHLORIDE, PRESERVATIVE FREE 10 ML: 5 INJECTION INTRAVENOUS at 20:41

## 2024-09-24 RX ADMIN — Medication 1000 MG: at 17:00

## 2024-09-24 ASSESSMENT — ENCOUNTER SYMPTOMS
ABDOMINAL PAIN: 0
WHEEZING: 0
COUGH: 0
CONSTIPATION: 0
SHORTNESS OF BREATH: 0
CHOKING: 0
VOICE CHANGE: 0
SINUS PRESSURE: 0
NAUSEA: 0
DIARRHEA: 0
BACK PAIN: 0
RHINORRHEA: 0
VOMITING: 0
CHEST TIGHTNESS: 0

## 2024-09-24 ASSESSMENT — PAIN - FUNCTIONAL ASSESSMENT
PAIN_FUNCTIONAL_ASSESSMENT: 0-10
PAIN_FUNCTIONAL_ASSESSMENT: INTOLERABLE, UNABLE TO DO ANY ACTIVE OR PASSIVE ACTIVITIES

## 2024-09-24 ASSESSMENT — PAIN DESCRIPTION - DESCRIPTORS
DESCRIPTORS: ACHING;BURNING

## 2024-09-24 ASSESSMENT — PAIN SCALES - GENERAL
PAINLEVEL_OUTOF10: 3
PAINLEVEL_OUTOF10: 5
PAINLEVEL_OUTOF10: 3
PAINLEVEL_OUTOF10: 0
PAINLEVEL_OUTOF10: 3
PAINLEVEL_OUTOF10: 7
PAINLEVEL_OUTOF10: 9
PAINLEVEL_OUTOF10: 9
PAINLEVEL_OUTOF10: 6

## 2024-09-24 ASSESSMENT — PAIN DESCRIPTION - ORIENTATION
ORIENTATION: LEFT

## 2024-09-24 ASSESSMENT — PAIN DESCRIPTION - LOCATION
LOCATION: KNEE

## 2024-09-24 ASSESSMENT — LIFESTYLE VARIABLES: SMOKING_STATUS: 1

## 2024-09-24 NOTE — BRIEF OP NOTE
Brief Postoperative Note      Patient: Elizabet Wolff  YOB: 1980  MRN: [id number]    Date of Procedure: 9/24/2024    Pre-Op Diagnosis Codes:   -Pyogenic arthritis of left knee joint    Post-Op Diagnosis:   -Pyogenic arthritis of left knee joint       Procedure(s):  Left knee arthroscopy irrigation and debridement    Surgeon(s):  Faustino Hodgson DO    Assistant:  Resident: Rekha Barlow DO    Anesthesia: General    Estimated Blood Loss (mL): 5 mL    IV Fluids: 2L    TT: 44 minutes    Complications: None    Specimens:   ID Type Source Tests Collected by Time Destination   1 : LEFT KNEE FLUID Body Fluid Joint, Knee CULTURE, FUNGUS, CULTURE, BODY FLUID, CULTURE, ANAEROBIC AND AEROBIC Faustino Hodgson DO 9/24/2024 0803        Implants:  * No implants in log *      Drains: * No LDAs found *    Findings:  Infection Present At Time Of Surgery (PATOS) (choose all levels that have infection present):  - Deep Infection (muscle/fascia) present as evidenced by purulent fluid  Other Findings: Purulent left knee, see op note for details    Electronically signed by Sam Cruz DO on 9/24/2024 at 8:40 AM

## 2024-09-24 NOTE — CARE COORDINATION
09/24/24 1732   Service Assessment   Patient Orientation Alert and Oriented   Cognition Alert   History Provided By Patient   Primary Caregiver Self   Support Systems Spouse/Significant Other   PCP Verified by CM No  (provided PCP list)   Prior Functional Level Independent in ADLs/IADLs   Can patient return to prior living arrangement Yes   Ability to make needs known: Good   Family able to assist with home care needs: Yes   Would you like for me to discuss the discharge plan with any other family members/significant others, and if so, who? Yes  (girlfriend Yoandy)   Social/Functional History   Lives With Significant other   Home Layout Two level;Able to Live on Main level with bedroom/bathroom   Home Access Stairs to enter with rails   Entrance Stairs - Number of Steps 4   ADL Assistance Independent   Homemaking Assistance Independent   Active    ( but does not drive)   Occupation Full time employment   Discharge Planning   Living Arrangements Spouse/Significant Other   Potential Assistance Needed Durable Medical Equipment   Potential DME Needed Crutches     .cma

## 2024-09-24 NOTE — PROGRESS NOTES
Intake / output   09/22 1901 - 09/24 0700  In: -   Out: 1200 [Urine:1200]  Physical Exam:  Physical Exam  Vitals and nursing note reviewed.   Constitutional:       General: He is not in acute distress.     Appearance: He is not diaphoretic.   HENT:      Head: Normocephalic and atraumatic.      Nose:      Right Sinus: No maxillary sinus tenderness or frontal sinus tenderness.      Left Sinus: No maxillary sinus tenderness or frontal sinus tenderness.      Mouth/Throat:      Pharynx: No oropharyngeal exudate.   Eyes:      General: No scleral icterus.     Conjunctiva/sclera: Conjunctivae normal.      Pupils: Pupils are equal, round, and reactive to light.   Neck:      Thyroid: No thyromegaly.      Vascular: No JVD.   Cardiovascular:      Rate and Rhythm: Normal rate and regular rhythm.      Pulses:           Dorsalis pedis pulses are 2+ on the right side and 2+ on the left side.      Heart sounds: Normal heart sounds. No murmur heard.  Pulmonary:      Effort: Pulmonary effort is normal.      Breath sounds: Normal breath sounds. No wheezing or rales.   Abdominal:      Palpations: Abdomen is soft. There is no mass.      Tenderness: There is no abdominal tenderness.   Musculoskeletal:      Cervical back: Full passive range of motion without pain and neck supple.      Comments: Left leg dressing in place.   Feet:      Comments: Callous present   Lymphadenopathy:      Head:      Right side of head: No submandibular adenopathy.      Left side of head: No submandibular adenopathy.      Cervical: No cervical adenopathy.   Skin:     General: Skin is warm.   Neurological:      Mental Status: He is alert and oriented to person, place, and time.      Motor: No tremor.   Psychiatric:         Behavior: Behavior is cooperative.           Laboratory findings:    Recent Labs     09/23/24  0930 09/24/24  0449   WBC 15.4* 14.3*   HGB 14.3 13.3   HCT 41.2 38.0*    315     Recent Labs     09/23/24  0930 09/24/24  0449   *  135*   K 3.6* 3.8   CL 92* 101   CO2 23 22   GLUCOSE 199* 152*   BUN 14 22*   CREATININE 1.1 1.1   CALCIUM 9.4 8.4*     Recent Labs     09/24/24  0449   LABA1C 6.1*   CHOL 134   HDL 61   CHOLHDLRATIO 2.0   TRIG 115   VLDL 23          No results found for: \"SPECGRAV\", \"PROTEINU\", \"RBCUA\", \"BLOODU\", \"BACTERIA\", \"NITRU\", \"WBCUA\", \"LEUKOCYTESUR\"    Imaging / Clinical Data :-   Vascular duplex lower extremity venous bilateral   Final Result      XR KNEE LEFT (3 VIEWS)   Final Result   1. Large joint effusion.  Septic joint is difficult to exclude on the basis   of this study.   2. Mild degenerative changes of the medial and patellofemoral compartments.   3. No acute osseous abnormality.            Hemoglobin A1C   Date Value Ref Range Status   09/24/2024 6.1 (H) 4.0 - 6.0 % Final       Clinical Course : stable  Assessment and Plan  :        Left knee septic arthritis -strep pneumoniae heavy growth  On antibiotics.-IV vancomycin.  And Rocephin.  S/p arthroscopic irrigation and drainage on 9/24/2024.  Orthopedic surgery and ID following.  Follow final culture sensitivity  Pain control  Type 2 diabetes mellitus with peripheral neuropathy and prior history of right  diabetic foot infection -diet controlled.  Last A1c 6.1.  Current smoker-recommend abstinence.  Obesity BMI 33.9-recommend lifestyle treaters.      Continue to monitor vitals , Intake / output ,  Cell count , HGB , Kidney function, oxygenation  as indicated .   Plan and updates discussed with patient ,  answers  explained to satisfaction.   Plan discussed with Staff Kristin HORTA     (This note is created with the assistance of a speech recognition program. While intending to generate a document that actually reflects the content of the visit, the document can still have some errors including those of syntax and sound a like substitutions which may escape proof reading. In such instances, actual meaning can be extrapolated by contextual diversion.)      [place]

## 2024-09-24 NOTE — DISCHARGE INSTRUCTIONS
Orthopedic Instructions:  Weight bearing status: Weight bearing as tolerated for the left leg  Keep dressing clean and dry.  Starting 3 days after surgery, Ok to remove dressing and replace with Band-aids daily until wound is dry. Then leave open to air.  Starting 3 days after surgery, if wound is no longer leaking, Ok to shower but no soaks in a bath, hot tub, pool, etc.  Physical Therapy for strengthening, range of motion, and gait training.  Ice (20 minutes on and off 1 hour) and elevate above the level of the heart to reduce swelling and throbbing pain.  Drink plenty of fluids.  Should urinate within 8 hours of surgery.  Call the office or come to Emergency Room if signs of infection appear (hot, swollen, red, draining pus, fever)  Take medications as prescribed.  Wean off narcotics (percocet/norco) as soon as possible. Do not take tylenol if still taking narcotics.  Follow up with Dr. Hodgson in his office  10/9/24 at 3:15 PM . Call [phone] with any questions or concerns

## 2024-09-24 NOTE — PLAN OF CARE
Patient:  Elizabet Wolff  YOB: 1980     44 y.o. male    Orthopedic post-operative instructions    Impression:  Elizabet Wolff is a 44 y.o. male is being seen for:    - -Pyogenic arthritis of left knee joint       Procedure(s): DOS 9/24/24  Left knee arthroscopy irrigation and debridement       Plan:  - No further orthopaedic surgical intervention planned at this time   - Soft dressings on LLE . Please maintain and keep clean and dry. Reinforce dressings as needed per nursing.  - WBAT  to the LLE  - Complete post-op antibiotics: Ancef 2g q8h x2 doses  - Diet: Adult general diet   - Abx: Antibiotics per IM  - DVT ppx:  Okay to start chemical anticoagulation POD#1.    - PT/OT evaluation post-op.  - Pain control and medical management per primary  - Ice and elevate extremity for pain and swelling  - Ok to discharge after completion of post-op antibiotics, evaluation by PT/OT, and once medically stable   - Follow up with Dr. Hodgson 10/9/24   -Please contact Ortho on call with any questions    Sam Cruz DO  Orthopedic Surgery, PGY-1  [place], [place]

## 2024-09-24 NOTE — CARE COORDINATION
09/24/24 1732   Service Assessment   Patient Orientation Alert and Oriented   Cognition Alert   History Provided By Patient   Primary Caregiver Self   Support Systems Spouse/Significant Other   PCP Verified by CM No  (provided PCP list)   Prior Functional Level Independent in ADLs/IADLs   Can patient return to prior living arrangement Yes   Ability to make needs known: Good   Family able to assist with home care needs: Yes   Would you like for me to discuss the discharge plan with any other family members/significant others, and if so, who? Yes  (girlfriend Yoandy)   Social/Functional History   Lives With Significant other   Home Layout Two level;Able to Live on Main level with bedroom/bathroom   Home Access Stairs to enter with rails   Entrance Stairs - Number of Steps 4   ADL Assistance Independent   Homemaking Assistance Independent   Active    ( but does not drive)   Occupation Full time employment   Discharge Planning   Living Arrangements Spouse/Significant Other   Potential Assistance Needed Durable Medical Equipment   Potential DME Needed Crutches     Case Management Assessment  Initial Evaluation    Date/Time of Evaluation: 9/24/2024 5:35 PM  Assessment Completed by: Valentina Ma RN    If patient is discharged prior to next notation, then this note serves as note for discharge by case management.    Patient Name: Elizabet Wolff                   YOB: 1980  Diagnosis: Septic arthritis (HCC) [M00.9]  Pyogenic arthritis of left knee joint, due to unspecified organism (HCC) [M00.9]                   Date / Time: 9/23/2024  9:19 AM    Patient Admission Status: Inpatient   Readmission Risk (Low < 19, Mod (19-27), High > 27): Readmission Risk Score: 7.5    Current PCP: No primary care provider on file.  PCP verified by CM? (P) No (provided PCP list)    Chart Reviewed: Yes      History Provided by: (P) Patient  Patient Orientation: (P) Alert and Oriented    Patient Cognition: (P)

## 2024-09-24 NOTE — ED PROVIDER NOTES
[facility] ED  Emergency Department Encounter  Emergency Medicine Resident     Pt Name:Elizabet Wolff  MRN: [id number]  Birthdate 1980  Date of evaluation: 9/24/24  PCP:  No primary care provider on file.  Note Started: 2:10 AM EDT      CHIEF COMPLAINT       Chief Complaint   Patient presents with    Leg Pain       HISTORY OF PRESENT ILLNESS  (Location/Symptom, Timing/Onset, Context/Setting, Quality, Duration, Modifying Factors, Severity.)      Elizabet Wolff is a 44 y.o. male who presents with left knee swelling for the past couple of days.  Patient denies any trauma, denies any surgery on the left knee, denies any hip pain, denies any rash.    Patient does endorse chills at home, denies subjective fever.  He denies any other major joint pain, denies a history of gout or similar swelling to any joint.    Patient states that he called 911 because he was unable to ambulate.  He states that he is concerned this might be related to improperly cooked food that he ate a few days ago which resulted in an episode of nausea [now resolved].    Patient denies any urethral discharge, denies any dysuria, denies abdominal pain, denies chest pain or shortness of breath.    PAST MEDICAL / SURGICAL / SOCIAL / FAMILY HISTORY      has no past medical history on file. Diabetes.     has no past surgical history on file.    Social History     Socioeconomic History    Marital status: Unknown     Spouse name: Not on file    Number of children: Not on file    Years of education: Not on file    Highest education level: Not on file   Occupational History    Not on file   Tobacco Use    Smoking status: Not on file    Smokeless tobacco: Not on file   Substance and Sexual Activity    Alcohol use: Not on file    Drug use: Not on file    Sexual activity: Not on file   Other Topics Concern    Not on file   Social History Narrative    Not on file     Social Determinants of Health     Financial Resource Strain: Not on file   Food

## 2024-09-24 NOTE — OP NOTE
Operative Note      Patient: Elizabet Wolff  YOB: 1980  MRN: [id number]     Date of Procedure: 9/24/2024     Pre-Op Diagnosis Codes:   -Pyogenic arthritis of left knee joint     Post-Op Diagnosis:   -Pyogenic arthritis of left knee joint       Procedure(s):  Arthroscopic irrigation and debridement of left knee septic arthritis      Surgeon(s):  Faustino Hodgson DO     Assistant:  Resident: Rekha Barlow DO     Anesthesia: General     Estimated Blood Loss (mL): 5 mL     IV Fluids: 2L     TT: 44 minutes     Complications: None     Specimens:   ID Type Source Tests Collected by Time Destination   1 : LEFT KNEE FLUID Body Fluid Joint, Knee CULTURE, FUNGUS, CULTURE, BODY FLUID, CULTURE, ANAEROBIC AND AEROBIC Faustino Hodgson DO 9/24/2024 0803           Implants:  * No implants in log *      Drains: * No LDAs found *     Findings:  Infection Present At Time Of Surgery (PATOS) (choose all levels that have infection present):  - Deep Infection (muscle/fascia) present as evidenced by purulent fluid  Other Findings: Purulent left knee; >100 brown fluid expressed on incision.     Detailed Description of Procedure:   Indications:   Elizabet Wolff is a 44 y.o. male who originally presented to [facility] after several days of increasing left knee pain. He got to the point where he was unable to ambulate on the left leg. He had a knee aspirate which was positive for over 200K WBC. Therefore, surgical intervention in the form of arthroscopic irrigation and debridement was discussed with the patient as well as risks and benefits were discussed. Patient was amenable to surgical intervention and agreed to move forward.     Operative summary:   On the day of surgery the patient was met in the pre-operative unit where written consent was confirmed and the operative site was verified to be marked with permanent marker. The patient was wheeled back to the operating suite and laid in the  supine position on the OR table. Patient underwent induction of anesthesia and endotracheal intubation. Antibiotics were held until just prior to incision in order to obtain cultures from the knee. A timeout was held and after all members were in agreement we proceeded forward with surgery. The leg was placed into a well padded arthroscopic leg power and a pillow was placed under the non-operative leg to protect the femoral neurovascular structures.    After standard sterile preparation and draping of the left lower extremity was complete, gravity exsanguination was performed as antibiotics were given. Tourniquet was inflated to 250mmHg. Anatomical landmarks were drawn out, and a #11 blade was used to make the lateral portal incision. Blunt dissection was taken down through subcutaneous tissue and through capsule with hemostats. The blunt obturator and trochar were then inserted into the joint in flexion and the knee was taken into extension to enter the suprapatellar pouch. Obturator was removed and > 100 cc of brown fluid was drained from the knee. 50cc of this was collected in a specimen cup to be sent for cultures. At this point, the arthroscope was inserted. We then began the arthroscopy.     We began by examining the suprapatellar pouch. It was noted that there was a significant amount of debris which required multiple efforts of flushing the joint and irrigating in order to clear our arthroscopic vision. Once debris has been cleared, we were able to assess the suprapatellar pouch which did show evidence of synovitis present.     Next we visualized the patellofemoral joint with minimal evidence of chondromalacia present. Patellofemoral tracking was analyzed and found to appear to be normal.     Next we entered the lateral then medial gutters and identified no loose bodies or large plica bands.     We then entered the medial joint. At this time an 18 gauge spinal needle was used to determine the starting point  for the medial portal. #11 blade was used to make the incision and the blunt obturator was used to enter the joint. Given the amount of synovitis present anteriorly, we inserted our arthroscopic shaver to debride the synovitis.     We examined the medial joint including the meniscus as well as articular cartilage. There was minimal chondromalacia along the femoral condyle and moderate evidence of degenerative changes along the medial tibia. Some degenerative tearing of the medial meniscus was appreciated. There were no loose bodies in the medial compartment.     Next the ACL and PCL were examined. Both were found to be intact, stable to probing. There was synovitis present anteriorly which was debrided.     We examined the lateral joint including the meniscus as well as articular cartilage. The meniscus was noted to have degenerative tearing but no unstable tears or pathologic excursion was present. A large grade 4 area of chondromalacia was noted along the femoral condyle with associated fissuring surrounding the defect.     Once we had assessed the entirety of the knee joint, we continued to use our shaver to debride synovitis and irrigate the knee. A total of 9 liters of fluid were irrigated through the knee and at the end of irrigation and debridement, there was no residual evidence of debris within the knee joint.     At this time all fluid was extracted from the joint, instruments removed, and closure performed. Portal sites were closed with 2-0 nylon suture. Sterile adaptic, 4x4s, ABDs, webril, and an ACE bandage were applied as dressing. Tourniquet was let down for a total of 44 minutes.    Anesthesia was reversed and the patient was extubated without complication. The patient was moved back over to the hospital bed and wheeled to the recovery unit in stable condition.    Dr. Hodgson was present for all critical portions of the case.     Post operative instructions will be IV antibiotics per infectious

## 2024-09-24 NOTE — PROGRESS NOTES
Physical Therapy        Physical Therapy Cancel Note      DATE: 2024    NAME: Elizabet Wolff  MRN: [id number]   : 1980      Patient not seen this date for Physical Therapy due to:    Surgery/Procedure: Arthroscopic irrigation and debridement of left knee septic arthritis today. Pt out of room upon PT checking in. Ck in PM if able, or       Electronically signed by Kala Hobbs PT on 2024 at 11:37 AM

## 2024-09-24 NOTE — PROGRESS NOTES
Orthopedic Progress Note    Patient:  Elizabet Wolff  YOB: 1980     44 y.o. male    Subjective:  Patient seen and examined this morning. No complaints or concerns. No issues overnight per nursing. Pain is well controlled on current regimen. Denies fever, HA, CP, SOB, N/V, dysuria, new numbness/tingling. Has not worked with PT, will work with them in the post-operative state.     Vitals reviewed, afebrile    Objective:   Vitals:    09/23/24 2045   BP: 135/85   Pulse: 71   Resp: 18   Temp:    SpO2:      Gen: NAD, cooperative    Cardiovascular: Regular rate   Respiratory: No acute respiratory distress, breathing comfortably    Orthopedic Exam    LLE: Skin intact.  Significant effusion noted to the knee.  Knee resting in slightly flexed and externally rotated. TTP globally around the knee.  Compartments soft and easily compressible. EHL/FHL/TA/GS complex motor intact. Sural/saphenous/SPN/DPN/plantar nerve distribution SILT. PT +2 with BCR.     Recent Labs     09/23/24  0930   WBC 15.4*   HGB 14.3   HCT 41.2      *   K 3.6*   BUN 14   CREATININE 1.1   GLUCOSE 199*      Meds:   Abx: Vancomycin, Ancef OCTOR  DVT ppx: Lovenox  See rec for complete list    Impression 44 y.o. male who is being seen for:    -Left knee septic arthritis     Plan  -Plan for OR 9/24 with Dr. Hodgson for left knee irrigation debridement   -WB status: Weightbearing as tolerated left lower extremity  -Diet: Patient will be n.p.o. today, 9/24   -abx/tetanus: Currently on vancomycin  -Cultures from aspiration currently growing GPCP.  -consent/marked  -Pre-op labs completed, Hgb 14.3  -CRP: 271  -Pain control per primary team discretion  -Ice and elevate extremity for pain and swelling  -Please contact ortho with any questions          Diaz Jiang DO  Orthopedic Surgery Resident, PGY-2  [place], [place]

## 2024-09-25 ENCOUNTER — APPOINTMENT (OUTPATIENT)
Dept: GENERAL RADIOLOGY | Age: 44
DRG: 549 | End: 2024-09-25

## 2024-09-25 PROBLEM — A49.1 PNEUMOCOCCAL INFECTION: Status: ACTIVE | Noted: 2024-09-25

## 2024-09-25 PROBLEM — A41.9 SEPTICEMIA (HCC): Status: ACTIVE | Noted: 2024-09-25

## 2024-09-25 LAB
ANION GAP SERPL CALCULATED.3IONS-SCNC: 13 MMOL/L (ref 9–16)
BASOPHILS # BLD: 0 K/UL (ref 0–0.2)
BASOPHILS NFR BLD: 0 % (ref 0–2)
BUN SERPL-MCNC: 17 MG/DL (ref 6–20)
CALCIUM SERPL-MCNC: 8.4 MG/DL (ref 8.6–10.4)
CHLORIDE SERPL-SCNC: 101 MMOL/L (ref 98–107)
CO2 SERPL-SCNC: 18 MMOL/L (ref 20–31)
CREAT SERPL-MCNC: 0.9 MG/DL (ref 0.7–1.2)
EKG ATRIAL RATE: 108 BPM
EKG P AXIS: 49 DEGREES
EKG P-R INTERVAL: 144 MS
EKG Q-T INTERVAL: 336 MS
EKG QRS DURATION: 108 MS
EKG QTC CALCULATION (BAZETT): 450 MS
EKG R AXIS: -45 DEGREES
EKG T AXIS: 54 DEGREES
EKG VENTRICULAR RATE: 108 BPM
EOSINOPHIL # BLD: 0 K/UL (ref 0–0.4)
EOSINOPHILS RELATIVE PERCENT: 0 % (ref 1–4)
ERYTHROCYTE [DISTWIDTH] IN BLOOD BY AUTOMATED COUNT: 17.2 % (ref 11.8–14.4)
GFR, ESTIMATED: >90 ML/MIN/1.73M2
GLUCOSE BLD-MCNC: 159 MG/DL (ref 75–110)
GLUCOSE BLD-MCNC: 166 MG/DL (ref 75–110)
GLUCOSE BLD-MCNC: 176 MG/DL (ref 75–110)
GLUCOSE SERPL-MCNC: 154 MG/DL (ref 74–99)
HCT VFR BLD AUTO: 36.7 % (ref 40.7–50.3)
HGB BLD-MCNC: 12 G/DL (ref 13–17)
IMM GRANULOCYTES # BLD AUTO: 0 K/UL (ref 0–0.3)
IMM GRANULOCYTES NFR BLD: 0 %
LYMPHOCYTES NFR BLD: 1.24 K/UL (ref 1–4.8)
LYMPHOCYTES RELATIVE PERCENT: 7 % (ref 24–44)
MCH RBC QN AUTO: 32.3 PG (ref 25.2–33.5)
MCHC RBC AUTO-ENTMCNC: 32.7 G/DL (ref 28.4–34.8)
MCV RBC AUTO: 98.7 FL (ref 82.6–102.9)
MICROORGANISM SPEC CULT: ABNORMAL
MICROORGANISM SPEC CULT: ABNORMAL
MICROORGANISM SPEC CULT: NORMAL
MICROORGANISM/AGENT SPEC: ABNORMAL
MICROORGANISM/AGENT SPEC: NORMAL
MONOCYTES NFR BLD: 0.71 K/UL (ref 0.1–0.8)
MONOCYTES NFR BLD: 4 % (ref 1–7)
MORPHOLOGY: ABNORMAL
NEUTROPHILS NFR BLD: 89 % (ref 36–66)
NEUTS SEG NFR BLD: 15.75 K/UL (ref 1.8–7.7)
NRBC BLD-RTO: 0 PER 100 WBC
PLATELET # BLD AUTO: 290 K/UL (ref 138–453)
PMV BLD AUTO: 9.9 FL (ref 8.1–13.5)
POTASSIUM SERPL-SCNC: 4 MMOL/L (ref 3.7–5.3)
RBC # BLD AUTO: 3.72 M/UL (ref 4.21–5.77)
SODIUM SERPL-SCNC: 132 MMOL/L (ref 136–145)
SPECIMEN DESCRIPTION: ABNORMAL
SPECIMEN DESCRIPTION: NORMAL
WBC OTHER # BLD: 17.7 K/UL (ref 3.5–11.3)

## 2024-09-25 PROCEDURE — 6370000000 HC RX 637 (ALT 250 FOR IP): Performed by: FAMILY MEDICINE

## 2024-09-25 PROCEDURE — 6370000000 HC RX 637 (ALT 250 FOR IP)

## 2024-09-25 PROCEDURE — 97166 OT EVAL MOD COMPLEX 45 MIN: CPT

## 2024-09-25 PROCEDURE — 1200000000 HC SEMI PRIVATE

## 2024-09-25 PROCEDURE — 80048 BASIC METABOLIC PNL TOTAL CA: CPT

## 2024-09-25 PROCEDURE — 2580000003 HC RX 258: Performed by: ANESTHESIOLOGY

## 2024-09-25 PROCEDURE — 2580000003 HC RX 258

## 2024-09-25 PROCEDURE — 6360000002 HC RX W HCPCS: Performed by: FAMILY MEDICINE

## 2024-09-25 PROCEDURE — 6360000002 HC RX W HCPCS: Performed by: INTERNAL MEDICINE

## 2024-09-25 PROCEDURE — 6360000002 HC RX W HCPCS

## 2024-09-25 PROCEDURE — 85025 COMPLETE CBC W/AUTO DIFF WBC: CPT

## 2024-09-25 PROCEDURE — 6360000002 HC RX W HCPCS: Performed by: ANESTHESIOLOGY

## 2024-09-25 PROCEDURE — 99222 1ST HOSP IP/OBS MODERATE 55: CPT | Performed by: INTERNAL MEDICINE

## 2024-09-25 PROCEDURE — 97535 SELF CARE MNGMENT TRAINING: CPT

## 2024-09-25 PROCEDURE — 97162 PT EVAL MOD COMPLEX 30 MIN: CPT

## 2024-09-25 PROCEDURE — 97530 THERAPEUTIC ACTIVITIES: CPT

## 2024-09-25 PROCEDURE — 87040 BLOOD CULTURE FOR BACTERIA: CPT

## 2024-09-25 PROCEDURE — 71045 X-RAY EXAM CHEST 1 VIEW: CPT

## 2024-09-25 PROCEDURE — 93010 ELECTROCARDIOGRAM REPORT: CPT | Performed by: INTERNAL MEDICINE

## 2024-09-25 PROCEDURE — 99232 SBSQ HOSP IP/OBS MODERATE 35: CPT | Performed by: FAMILY MEDICINE

## 2024-09-25 PROCEDURE — 36415 COLL VENOUS BLD VENIPUNCTURE: CPT

## 2024-09-25 PROCEDURE — 82947 ASSAY GLUCOSE BLOOD QUANT: CPT

## 2024-09-25 RX ORDER — OXYCODONE AND ACETAMINOPHEN 5; 325 MG/1; MG/1
1 TABLET ORAL EVERY 4 HOURS PRN
Status: DISCONTINUED | OUTPATIENT
Start: 2024-09-25 | End: 2024-09-27 | Stop reason: HOSPADM

## 2024-09-25 RX ORDER — CHLORDIAZEPOXIDE HYDROCHLORIDE 10 MG/1
10 CAPSULE, GELATIN COATED ORAL 3 TIMES DAILY
Status: DISCONTINUED | OUTPATIENT
Start: 2024-09-25 | End: 2024-09-27 | Stop reason: HOSPADM

## 2024-09-25 RX ORDER — LORAZEPAM 2 MG/ML
0.5 INJECTION INTRAMUSCULAR EVERY 4 HOURS PRN
Status: DISCONTINUED | OUTPATIENT
Start: 2024-09-25 | End: 2024-09-27 | Stop reason: HOSPADM

## 2024-09-25 RX ORDER — FENTANYL CITRATE 50 UG/ML
25 INJECTION, SOLUTION INTRAMUSCULAR; INTRAVENOUS
Status: DISCONTINUED | OUTPATIENT
Start: 2024-09-25 | End: 2024-09-26

## 2024-09-25 RX ORDER — OXYCODONE AND ACETAMINOPHEN 5; 325 MG/1; MG/1
2 TABLET ORAL EVERY 4 HOURS PRN
Status: DISCONTINUED | OUTPATIENT
Start: 2024-09-25 | End: 2024-09-27 | Stop reason: HOSPADM

## 2024-09-25 RX ADMIN — PROCHLORPERAZINE EDISYLATE 5 MG: 5 INJECTION INTRAMUSCULAR; INTRAVENOUS at 04:54

## 2024-09-25 RX ADMIN — ONDANSETRON 4 MG: 2 INJECTION INTRAMUSCULAR; INTRAVENOUS at 03:04

## 2024-09-25 RX ADMIN — Medication 2000 MG: at 17:22

## 2024-09-25 RX ADMIN — SODIUM CHLORIDE, PRESERVATIVE FREE 10 ML: 5 INJECTION INTRAVENOUS at 09:59

## 2024-09-25 RX ADMIN — SODIUM CHLORIDE 1500 MG: 9 INJECTION, SOLUTION INTRAVENOUS at 00:13

## 2024-09-25 RX ADMIN — Medication 2000 MG: at 00:08

## 2024-09-25 RX ADMIN — ENOXAPARIN SODIUM 30 MG: 100 INJECTION SUBCUTANEOUS at 09:59

## 2024-09-25 RX ADMIN — SODIUM CHLORIDE, PRESERVATIVE FREE 10 ML: 5 INJECTION INTRAVENOUS at 20:32

## 2024-09-25 RX ADMIN — Medication 2000 MG: at 13:05

## 2024-09-25 RX ADMIN — ONDANSETRON 4 MG: 4 TABLET, ORALLY DISINTEGRATING ORAL at 12:28

## 2024-09-25 RX ADMIN — PANTOPRAZOLE SODIUM 40 MG: 40 TABLET, DELAYED RELEASE ORAL at 06:12

## 2024-09-25 RX ADMIN — ONDANSETRON 4 MG: 4 TABLET, ORALLY DISINTEGRATING ORAL at 20:39

## 2024-09-25 RX ADMIN — CHLORDIAZEPOXIDE HYDROCHLORIDE 10 MG: 10 CAPSULE ORAL at 20:32

## 2024-09-25 ASSESSMENT — ENCOUNTER SYMPTOMS
VOMITING: 0
WHEEZING: 0
ABDOMINAL PAIN: 0
COUGH: 0
CHOKING: 0
RHINORRHEA: 0
NAUSEA: 0
CHEST TIGHTNESS: 0
VOICE CHANGE: 0
DIARRHEA: 0
SHORTNESS OF BREATH: 0
BACK PAIN: 0
CONSTIPATION: 0
SINUS PRESSURE: 0

## 2024-09-25 ASSESSMENT — PAIN DESCRIPTION - ORIENTATION: ORIENTATION: LEFT

## 2024-09-25 ASSESSMENT — PAIN SCALES - GENERAL
PAINLEVEL_OUTOF10: 3
PAINLEVEL_OUTOF10: 6

## 2024-09-25 ASSESSMENT — PAIN DESCRIPTION - LOCATION: LOCATION: KNEE

## 2024-09-25 NOTE — CONSULTS
ATTESTATION:    I have discussed the case, including pertinent history and exam findings with the medical resident. I have evaluated the  History, physical findings and pictures of the patient and the key elements of the encounter have been performed by me. I have reviewed the laboratory data, other diagnostic studies and discussed them with the medical resident. I have updated the medical record where necessary.    I agree with the assessment, plan and orders as documented by the medical resident and I have modified them as necessary.     Elements of Medical Decision Making:  Note: I have independently performed the steps listed below as part of the medical decision making and evaluation.   Examined and discussed with patient.  Septic arthritis of the left knee  Reactive leukocytosis  Most likely preceding septicemia which seeded the knee joint  Labs, medications, radiologic studies were reviewed with personal review of films  Radiologic studies  Lab work  Cultures  Synovial fluid left knee 9/23/2024 Streptococcus pneumoniae  Large amounts of data were reviewed  Patient presented through the emergency room on 9/24/2024 with progressive swelling of the left knee  He had called the EMS service because of inability to ambulate  He denied any prior trauma to the left knee but indicated that he does have gout in the past which had produced a swelling of other joints  He denied fevers but indicated that he had some chills at home  On physical examination he had an acute large left knee effusion with reduced range of motion  And arthrocentesis yielded 7 9 mL of purulent synovial fluid.  Culture of the synovial fluid has yielded the Streptococcus pneumoniae  Patient had initially been treated with vancomycin and Rocephin  He underwent I&D in the OR on 9/24/2024.  Cultures from the OR are showing Streptococcus pneumonia  White count and CRP are both elevated  Discussed with nursing Staff, Discharge planner  Dr Albert's  alignment is normal.  No acute displaced fracture or dislocation. Mild osteophyte spurring at the margins of the medial and patellofemoral compartments.     1. Large joint effusion.  Septic joint is difficult to exclude on the basis of this study. 2. Mild degenerative changes of the medial and patellofemoral compartments. 3. No acute osseous abnormality.       Medical Decision Pww[facility]le-Xq[facility]vlef-[facility]:     Results       Procedure Component Value Units Date/Time    Culture, Blood 1 [[id number]]     Order Status: Sent Specimen: Blood     Culture, Blood 1 [[id number]]     Order Status: Sent Specimen: Blood     Culture, Fungus [[id number]] Collected: 09/24/24 0803    Order Status: No result Specimen: Body Fluid from Joint, Knee     Culture, Body Fluid [[id number]] Collected: 09/24/24 0803    Order Status: No result Specimen: Body Fluid from Joint, Knee     Culture, Anaerobic and Aerobic [[id number]] Collected: 09/24/24 0803    Order Status: No result Specimen: Body Fluid from Joint, Knee     Culture, Fungus [[id number]] Collected: 09/24/24 0740    Order Status: No result Specimen: Other Updated: 09/24/24 0904    Culture, Anaerobic and Aerobic [[id number]]  (Abnormal) Collected: 09/24/24 0740    Order Status: Completed Specimen: Joint, Knee Updated: 09/24/24 1143     Specimen Description .KNEE, JOINT LEFT FLUID     Direct Exam MANY NEUTROPHILS      MANY GRAM POSITIVE COCCI IN PAIRS     Culture PENDING    C.trachomatis N.gonorrhoeae DNA, Urine [[id number]] Collected: 09/23/24 1504    Order Status: Completed Specimen: Urine Updated: 09/24/24 1001     Specimen Description .URINE     C. trachomatis DNA ,Urine NEGATIVE     Comment: CHLAMYDIA TRACHOMATIS DNA not detected by nucleic acid amplification.        This test is intended for medical purposes only and is not valid for the evaluation of   suspected sexual abuse or for other forensic purposes.  In certain contexts, culture may be required to meet applicable laws and  Status: Canceled Specimen: Body Fluid from Synovial Fluid     Gram Stain [[id number]] Collected: 09/23/24 1152    Order Status: Canceled Specimen: Synovial Fluid               Medical Decision Making-Other:     Note:    Thank you for allowing us to participate in the care of this patient. Please call with questions.    Korina Aguilar DPM  Pager: [phone] - Office: [phone]

## 2024-09-25 NOTE — PROGRESS NOTES
Orthopedic Progress Note    Patient:  Elizabet Wolff  YOB: 1980     44 y.o. male    Subjective:  Patient seen and examined this morning. Overall, patient feels that his left knee pain and swelling has improved considerably compared to before surgery. However, he still complains of 7/10 pain with resting today. Patient has been feeling nauseous overnight and was spitting into a basin at bedside. He recentl received antinausea medicine which helped. Per nursing he started feeling ill after his first dose of Vanco early this morning. He states he has also been feeling warm with chills, and slight SOB. Denies HA, CP, new numbness/tingling. No BM, but flatus +. No other issues overnight per nursing. Pain is well controlled on current regimen. Patient did not work with PT yesterday but will plan to today.     Vitals reviewed, afebrile    Objective:   Vitals:    09/25/24 0000   BP: 132/68   Pulse: 78   Resp: 16   Temp: 99.1 °F (37.3 °C)   SpO2: 98%     Gen: NAD, cooperative    Cardiovascular: Regular rate   Respiratory: No acute respiratory distress, breathing comfortably    Orthopedic Exam  LLE:    Dressings and overlying compressive Ace wrap are clean, dry, and intact without strike-through. Compartments soft. Knee resting in slight flexion, unable to actively range knee due to pain. Passive knee ROM 95/5 degrees, limited due to pain. TA/EHL/FHL/GS motor intact. Saph/Solo/DP/SP nerves SILT. 2+ DP/PT pulses with BCR.       Recent Labs     09/24/24  0449   WBC 14.3*   HGB 13.3   HCT 38.0*      *   K 3.8   BUN 22*   CREATININE 1.1   GLUCOSE 152*      Meds:   Abx: Vancomycin , Ceftriaxone  DVT ppx: Lovenox  See rec for complete list    Impression   Elizabet Wolff is a 44 y.o. male is being seen for:     Pyogenic arthritis of left knee joint       Procedure(s): DOS 9/24/24  Left knee arthroscopy irrigation and debridement      Plan:  - No further orthopaedic surgical intervention planned at  this time   - Soft dressings on LLE. Please maintain and keep clean and dry. Reinforce dressings as needed per nursing.  - WBAT to the LLE  - Diet: Adult general diet   - Abx: Antibiotics per IM. Currently on Vancomycin, Ceftriaxone.   - DVT ppx: Okay to start chemical anticoagulation POD#1.    - PT/OT evaluation post-op.  - Pain control and medical management per primary  - Ice and elevate extremity for pain and swelling  - Ok to discharge after completion of antibiotics, evaluation by PT/OT, and once medically stable   - Follow up with Dr. Hodgson 10/9/24   - Please contact Ortho on call with any questions    Freddie Vaughn DO  Orthopedic Surgery Resident, PGY-1  [place], [place]

## 2024-09-25 NOTE — PROGRESS NOTES
Occupational Therapy  Facility/Department: [address] MED SURG   Occupational Therapy Initial Evaluation    Patient Name: Elizabet Wolff        MRN: [id number]    : 1980    Date of Service: 2024    Chief Complaint   Patient presents with    Leg Pain     Discharge Recommendations  Discharge Recommendations: Patient would benefit from continued therapy after discharge    OT Equipment Recommendations  Equipment Needed: Yes  Mobility Devices: Walker, ADL Assistive Devices  Walker: Rolling  ADL Assistive Devices: Transfer Tub Bench, Reacher, Long-handled Sponge, Long-handled Shoe Horn, Sock-Aid Hard    Assessment  Performance deficits / Impairments: Decreased functional mobility ;Decreased ADL status;Decreased endurance;Decreased balance;Decreased high-level IADLs  Assessment: Pt agreed to OT this date. Pt completed bed mobility with Min A for LLE progression, limited by pain. Pt engaged in static and dynamic sitting activities with SUP for ~10-15 minutes. ADLs completed included LB dressing, Mod I for RLE and increased assistance expected for LLE dressing d/t pain and limited ROM. Functional transfers and functional mobility were performed with use of RW , requiring Min A X2 for transfers and CGA for functional mobility d/t balance impairments and pain. Pt will benefit from continued OT services to address deficits in balance, endurance, and pain management through use of skilled therapeutic interventions to increase functional independence and safety with ADLs/IADLs and functional transfers/mobility.  Prognosis: Good  Decision Making: Medium Complexity  REQUIRES OT FOLLOW-UP: Yes  Activity Tolerance  Activity Tolerance: Patient Tolerated treatment well  Safety Devices  Type of Devices: Call light within reach;Gait belt;Nurse notified;Left in bed  Restraints  Restraints Initially in Place: No    Restrictions/Precautions  Restrictions/Precautions  Restrictions/Precautions: Weight Bearing  Required Braces or  4+/5)  Coordination: Within functional limits  Tone: Normal  Sensation: Intact  Hand Dominance: Right     Objective  Orientation  Overall Orientation Status: Within Functional Limits  Orientation Level: Oriented X4  Cognition  Overall Cognitive Status: WFL    Activities of Daily Living  Feeding: Modified independent ;Setup;Based on clinical judgement  Grooming: Setup;Based on clinical judgement;Modified independent   UE Bathing: Setup;Increased time to complete;Based on clinical judgement;Modified independent   LE Bathing: Minimal assistance;Setup;Increased time to complete;Based on clinical judgement  UE Dressing: Contact guard assistance;Setup;Increased time to complete;Based on clinical judgement  LE Dressing: Setup;Increased time to complete;Minimal assistance  LE Dressing Skilled Clinical Factors: pt donned sock to R foot utilizing figure-four strategy independently. Pt declined to don sock to L foot d/t pain, expecting increased assistance to dress LLE  Toileting: Setup;Increased time to complete;Based on clinical judgement;Minimal assistance    Balance  Balance  Sitting: Without support (static and dynamic sitting was performed at EOB for participation in ADLs, UE assessment, and education ~10 min with SUP)  Standing: With support (static and dynamic standing was performed with BUE supported on RW requiring CGA once balance was established ~3 min total)    Transfers/Mobility  Bed mobility  Supine to Sit: Minimal assistance (Provided for LLE progression.)  Sit to Supine: Minimal assistance (Provided for LLE progression)  Scooting: Stand by assistance  Bed Mobility Comments: HOB ~ 30 degrees    Transfers  Sit to stand: Minimal assistance;2 Person assistance  Stand to sit: Minimal assistance;2 Person assistance  Transfer Comments: pt required Min A X2 to perform sit <> stand transfer with use of RW noting difficulty d/t LLE pain. Min VCs for hand placement required         Functional Mobility: Contact guard  assistance;Increased time to complete;Adaptive equipment;Setup  Functional Mobility Skilled Clinical Factors: functional mobility was performed within hospital room requiring CGA while using RW for support, ed on step-to gait pattern with good follow through       Patient Education  Patient Education  Education Provided Comments: Pt ed on OT role, OT POC, safety awareness, transfer training, DME use, bed mobility training, step-to gait pattern, adaptive ADL tech, fall prevention, and importance of continued OT. Good return    Goals  Short Term Goals  Time Frame for Short Term Goals: By discharge, pt will:  Short Term Goal 1: Demo Mod I for functional transfers and functional mobility with use of LRAD for engagement in ADLs/IADLs  Short Term Goal 2: Implement 2 non-pharmaceutical pain management strategies throughout ADL/IADL engagement with 0 VCs to increase functional activity participation  Short Term Goal 3: Demo Mod I for LB ADLs and toileting tasks with setup and AE use PRN  Short Term Goal 4: Engage in 10 min dynamic standing activity with SUP and unilateral UE release from support for improved balance during ADL/IADL participation  Short Term Goal 5: Demo good safety awareness throughout therapy session with 0 VCs    Plan  Occupational Therapy Plan  Times Per Week: 3-5 x/wk  Current Treatment Recommendations: Balance training, Functional mobility training, Pain management, Safety education & training, Patient/Caregiver education & training, Equipment evaluation, education, & procurement, Self-Care / ADL, Home management training, Endurance training    AM-PAC Daily Activities Inpatient  AM-PAC Daily Activity - Inpatient   How much help is needed for putting on and taking off regular lower body clothing?: A Little  How much help is needed for bathing (which includes washing, rinsing, drying)?: A Little  How much help is needed for toileting (which includes using toilet, bedpan, or urinal)?: A Little  How much

## 2024-09-25 NOTE — PLAN OF CARE
Problem: Safety - Adult  Goal: Free from fall injury  9/25/2024 0435 by Barbie Ragland RN  Outcome: Progressing  9/24/2024 1736 by Kristin Hobbs RN  Outcome: Progressing     Problem: Pain  Goal: Verbalizes/displays adequate comfort level or baseline comfort level  9/25/2024 0435 by Barbie Ragland RN  Outcome: Progressing  9/24/2024 1736 by Kristin Hobbs RN  Outcome: Progressing     Problem: ABCDS Injury Assessment  Goal: Absence of physical injury  9/25/2024 0435 by Barbie Ragland RN  Outcome: Progressing  9/24/2024 1736 by Kristin Hobbs RN  Outcome: Progressing

## 2024-09-25 NOTE — PROGRESS NOTES
[facility]  Office: [phone]  Yash Moreno, DO, Terrence Arenas, DO, Christiano Looney DO, Alonzo Chaves, DO, Rony Kelsey MD, Ioana Blanchard MD, Shai Albert MD, Yas Boyle MD,  Diaz Handley MD, Srinivasa Lopez MD, Karime Madrigal MD,  Bulmaro Poon DO, Radhika Alaniz MD, Luigi Antonio MD, Bassam Moreno DO, Esthela Moncada MD,  Saravanan Yates DO, Krissy Mares MD, Adrianna Sumner MD, Lesli oBles MD, Shahid Bashir MD,  Travis Rivers MD, Sherice Ramachandran MD, Slade Gates MD, Bobby Tejada MD, Geln Stanton MD, Abdi Tadeo MD, Malcom Sharpe, DO, Brian Aparicio DO, Tristin Nation DO, Toro Martínez MD, Shirley Waterhouse, CNP,  Nanci Iglesias CNP, Malcom Lucio, CNP,  Claudia Vigil, [facility], Linda Champagne, CNP, Ny Montano, CNP, Colleen Hernandez, CNP, Michell Gabriel, CNP, Laura Shin, PA-C, Radha Beasley PA-C, Lamar Clemens, CNP, Austin Gregory, CNP,  Elissa Meza, CNP, Sylvia Collins, CNP, Deedee Foley, CNP, Esperanza Martinez, CNS, Edilma Lassiter, CNP, Shellie Lopez, CNP, Tracy Schwab, CNP       [facility]      Daily Progress Note     Admit Date: 9/23/2024  Bed/Room No.  0342/0342-01  Admitting Physician : Shai Albert MD  Code Status :Full Code  Hospital Day:  LOS: 2 days   Chief Complaint:     Chief Complaint   Patient presents with    Leg Pain     Principal Problem:    Septic arthritis (HCC)  Active Problems:    Elevated blood pressure reading  Resolved Problems:    * No resolved hospital problems. *    Subjective :        Interval History/Significant events :  09/25/24    Patient has been nauseated all morning and is also having sweating.  Patient remains afebrile.  He is not feeling well today.  Patient is breathing on room air.  He denies any breathing difficulty.  Pain in leg is controlled.  Vitals - Temp:  afebrile ,  Heart Rate - Normal Cardiac Rhythm - regular rate and rhythm Blood Pressure mostly controlled  Labs / test results

## 2024-09-25 NOTE — PROGRESS NOTES
Physical Therapy  Facility/Department: [address] MED SURG  Physical Therapy Initial Assessment    Name: Elizabet Wolff  : 1980  MRN: [id number]  Date of Service: 2024  Chief Complaint   Patient presents with    Leg Pain        Discharge Recommendations: Further therapy recommended at discharge.The patient should be able to tolerate at least 3 hours of therapy per day over 5 days or 15 hours over 7 days.   This patient may benefit from a Physical Medicine and Rehab consult.        PT Equipment Recommendations  Equipment Needed: Yes  Mobility Devices: Walker  Walker: Rolling      Patient Diagnosis(es): The encounter diagnosis was Pyogenic arthritis of left knee joint, due to unspecified organism (HCC).  Past Medical History:  has a past medical history of Diabetic infection of right foot (HCC), Osteomyelitis of left foot (HCC), Peripheral neuropathy, and Type 2 diabetes mellitus with diabetic neuropathy (HCC).  Past Surgical History:  has a past surgical history that includes Incision and drainage of wound (Left, 2024) and Knee arthroscopy (Left, 2024).    Assessment  Body Structures, Functions, Activity Limitations Requiring Skilled Therapeutic Intervention: Decreased functional mobility ;Decreased balance;Decreased ROM;Decreased strength;Decreased posture;Increased pain;Decreased endurance  Assessment: Pt Ramya for bed mobility, Ramya x 2 for transfers, and CGA for ambulation. Pt ambulated 15 feet + 3 feet left lateral with RW and slow elva. Pt at baseline is ambulating without an AD for all ambulation. Pt educated on step to gait with LLE and WBAT precautions with LLE with good feedback and return. Recommend skilled PT services to address deficits with bed mobility, transfers, gait, functional mobility, and strength.  Therapy Prognosis: Good  Decision Making: Medium Complexity  Requires PT Follow-Up: Yes  Activity Tolerance  Activity Tolerance: Patient limited by pain;Patient limited by  Demonstration;Verbal  Barriers to Learning: None  Education Outcome: Verbalized understanding      Therapy Time   Individual Concurrent Group Co-treatment   Time In 1322         Time Out 1342         Minutes 20         Timed Code Treatment Minutes: 8 Minutes PT / OT co-alexander Johnson, [email or web address]  This treatment/evaluation completed by signing SPT. Signing PT agrees with treatment and documentation.

## 2024-09-26 PROBLEM — F10.939 ALCOHOL WITHDRAWAL SYNDROME WITH COMPLICATION (HCC): Status: ACTIVE | Noted: 2024-09-26

## 2024-09-26 LAB
ANION GAP SERPL CALCULATED.3IONS-SCNC: 14 MMOL/L (ref 9–16)
BASOPHILS # BLD: 0 K/UL (ref 0–0.2)
BASOPHILS NFR BLD: 0 % (ref 0–2)
BUN SERPL-MCNC: 14 MG/DL (ref 6–20)
CALCIUM SERPL-MCNC: 8.5 MG/DL (ref 8.6–10.4)
CHLORIDE SERPL-SCNC: 99 MMOL/L (ref 98–107)
CO2 SERPL-SCNC: 20 MMOL/L (ref 20–31)
CREAT SERPL-MCNC: 0.7 MG/DL (ref 0.7–1.2)
CRP SERPL HS-MCNC: 194 MG/L (ref 0–5)
EOSINOPHIL # BLD: 0 K/UL (ref 0–0.44)
EOSINOPHILS RELATIVE PERCENT: 0 % (ref 1–4)
ERYTHROCYTE [DISTWIDTH] IN BLOOD BY AUTOMATED COUNT: 16.9 % (ref 11.8–14.4)
GFR, ESTIMATED: >90 ML/MIN/1.73M2
GLUCOSE BLD-MCNC: 157 MG/DL (ref 75–110)
GLUCOSE BLD-MCNC: 175 MG/DL (ref 75–110)
GLUCOSE BLD-MCNC: 179 MG/DL (ref 75–110)
GLUCOSE SERPL-MCNC: 153 MG/DL (ref 74–99)
HCT VFR BLD AUTO: 36 % (ref 40.7–50.3)
HGB BLD-MCNC: 12.2 G/DL (ref 13–17)
IMM GRANULOCYTES # BLD AUTO: 0.15 K/UL (ref 0–0.3)
IMM GRANULOCYTES NFR BLD: 1 %
INTERVENTION: NORMAL
LYMPHOCYTES NFR BLD: 1.69 K/UL (ref 1.1–3.7)
LYMPHOCYTES RELATIVE PERCENT: 11 % (ref 24–43)
MAGNESIUM SERPL-MCNC: 2.4 MG/DL (ref 1.6–2.6)
MCH RBC QN AUTO: 32.2 PG (ref 25.2–33.5)
MCHC RBC AUTO-ENTMCNC: 33.9 G/DL (ref 28.4–34.8)
MCV RBC AUTO: 95 FL (ref 82.6–102.9)
MONOCYTES NFR BLD: 1.23 K/UL (ref 0.1–1.2)
MONOCYTES NFR BLD: 8 % (ref 3–12)
MORPHOLOGY: ABNORMAL
NEUTROPHILS NFR BLD: 80 % (ref 36–65)
NEUTS SEG NFR BLD: 12.33 K/UL (ref 1.5–8.1)
NRBC BLD-RTO: 0 PER 100 WBC
PLATELET # BLD AUTO: 327 K/UL (ref 138–453)
PMV BLD AUTO: 10.1 FL (ref 8.1–13.5)
POTASSIUM SERPL-SCNC: 3.5 MMOL/L (ref 3.7–5.3)
RBC # BLD AUTO: 3.79 M/UL (ref 4.21–5.77)
SODIUM SERPL-SCNC: 133 MMOL/L (ref 136–145)
WBC OTHER # BLD: 15.4 K/UL (ref 3.5–11.3)

## 2024-09-26 PROCEDURE — 99232 SBSQ HOSP IP/OBS MODERATE 35: CPT | Performed by: FAMILY MEDICINE

## 2024-09-26 PROCEDURE — 99232 SBSQ HOSP IP/OBS MODERATE 35: CPT | Performed by: INTERNAL MEDICINE

## 2024-09-26 PROCEDURE — 80048 BASIC METABOLIC PNL TOTAL CA: CPT

## 2024-09-26 PROCEDURE — 6360000002 HC RX W HCPCS: Performed by: FAMILY MEDICINE

## 2024-09-26 PROCEDURE — 36415 COLL VENOUS BLD VENIPUNCTURE: CPT

## 2024-09-26 PROCEDURE — 2580000003 HC RX 258

## 2024-09-26 PROCEDURE — 82947 ASSAY GLUCOSE BLOOD QUANT: CPT

## 2024-09-26 PROCEDURE — 6370000000 HC RX 637 (ALT 250 FOR IP)

## 2024-09-26 PROCEDURE — 6370000000 HC RX 637 (ALT 250 FOR IP): Performed by: FAMILY MEDICINE

## 2024-09-26 PROCEDURE — 83735 ASSAY OF MAGNESIUM: CPT

## 2024-09-26 PROCEDURE — 86140 C-REACTIVE PROTEIN: CPT

## 2024-09-26 PROCEDURE — 6360000002 HC RX W HCPCS: Performed by: INTERNAL MEDICINE

## 2024-09-26 PROCEDURE — 1200000000 HC SEMI PRIVATE

## 2024-09-26 PROCEDURE — 2580000003 HC RX 258: Performed by: ANESTHESIOLOGY

## 2024-09-26 PROCEDURE — 85025 COMPLETE CBC W/AUTO DIFF WBC: CPT

## 2024-09-26 RX ADMIN — CHLORDIAZEPOXIDE HYDROCHLORIDE 10 MG: 10 CAPSULE ORAL at 20:13

## 2024-09-26 RX ADMIN — CHLORDIAZEPOXIDE HYDROCHLORIDE 10 MG: 10 CAPSULE ORAL at 08:17

## 2024-09-26 RX ADMIN — CHLORDIAZEPOXIDE HYDROCHLORIDE 10 MG: 10 CAPSULE ORAL at 14:19

## 2024-09-26 RX ADMIN — PANTOPRAZOLE SODIUM 40 MG: 40 TABLET, DELAYED RELEASE ORAL at 08:17

## 2024-09-26 RX ADMIN — FENTANYL CITRATE 25 MCG: 50 INJECTION, SOLUTION INTRAMUSCULAR; INTRAVENOUS at 07:24

## 2024-09-26 RX ADMIN — SODIUM CHLORIDE, PRESERVATIVE FREE 10 ML: 5 INJECTION INTRAVENOUS at 20:13

## 2024-09-26 RX ADMIN — SODIUM CHLORIDE, PRESERVATIVE FREE 10 ML: 5 INJECTION INTRAVENOUS at 08:17

## 2024-09-26 RX ADMIN — Medication 2000 MG: at 16:08

## 2024-09-26 RX ADMIN — LORAZEPAM 0.5 MG: 2 INJECTION INTRAMUSCULAR; INTRAVENOUS at 09:18

## 2024-09-26 RX ADMIN — OXYCODONE HYDROCHLORIDE AND ACETAMINOPHEN 2 TABLET: 5; 325 TABLET ORAL at 16:07

## 2024-09-26 ASSESSMENT — PAIN SCALES - GENERAL
PAINLEVEL_OUTOF10: 4
PAINLEVEL_OUTOF10: 4
PAINLEVEL_OUTOF10: 0
PAINLEVEL_OUTOF10: 8
PAINLEVEL_OUTOF10: 7

## 2024-09-26 ASSESSMENT — ENCOUNTER SYMPTOMS
COUGH: 0
NAUSEA: 1
WHEEZING: 0
SINUS PRESSURE: 0
VOICE CHANGE: 0
RHINORRHEA: 0
CONSTIPATION: 0
ABDOMINAL PAIN: 0
DIARRHEA: 0
SHORTNESS OF BREATH: 0
CHEST TIGHTNESS: 0
VOMITING: 1
CHOKING: 0
BACK PAIN: 0

## 2024-09-26 ASSESSMENT — PAIN DESCRIPTION - LOCATION
LOCATION: BACK
LOCATION: LEG

## 2024-09-26 ASSESSMENT — PAIN DESCRIPTION - ORIENTATION: ORIENTATION: LEFT

## 2024-09-26 ASSESSMENT — PAIN DESCRIPTION - DESCRIPTORS
DESCRIPTORS: ACHING
DESCRIPTORS: ACHING

## 2024-09-26 NOTE — PROGRESS NOTES
Orthopedic Progress Note    Patient:  Elizabet Wolff  YOB: 1980     44 y.o. male    Subjective:  Patient seen and examined this morning.  Patient states that his pain has mildly improved since yesterday.  However his nausea and vomiting have remained as he states that he had 2 episodes of vomiting last night despite the use of Zofran.  In addition he is also endorsing some chills and slight shortness of breath at this time.  No issues overnight per nursing. Pain is well controlled on current regimen. Denies HA, SOB, dysuria, new numbness/tingling. No BM, flatus +. Patient work with physical therapy yesterday and was able to ambulate a total of 18 feet with a rolling walker.  Dressings to the left lower extremity changed today.     Vitals reviewed, afebrile    Objective:   Vitals:    09/25/24 2026   BP: (!) 176/90   Pulse: 91   Resp: 18   Temp: 99 °F (37.2 °C)   SpO2: 96%     Gen: NAD, cooperative    Cardiovascular: Regular rate on monitor  Respiratory: No acute respiratory distress, breathing comfortably    Orthopedic Exam  LLE:  Ace bandage and soft dressings applied to the LLE taken down this morning to examine surgical incisions.  Surgical incisions well-approximated to the medial and lateral aspects of the left knee with sutures in place.  No drainage able to be expressed.  No palpable fluctuance appreciated.  Generalized edema about the left knee.  Mild tenderness to palpation at and about the surgical sites. Compartments soft and easily compressible.  Patient is able to actively extend her knee to approximately 5 degrees and flex their knee to approximately 90 degrees.  Patient is able to passively extend the knee to 0 degrees and flex her knee to approximately 110 degrees.  EHL/FHL/TA/GSC gross motor intact.  Sural/saphenous/SPN/DPN/plantar sensation to light touch intact.  DP pulse 2+ with BCR.    Recent Labs     09/25/24  0636   WBC 17.7*   HGB 12.0*   HCT 36.7*      *    K 4.0   BUN 17   CREATININE 0.9   GLUCOSE 154*      Meds:   Abx: Ceftriaxone  DVT ppx: Lovenox 30 mg twice daily  See rec for complete list    Impression 44 y.o. male who  is  being seen for:    Septic arthritis of left knee joint       Procedure(s): DOS 9/24/24  Left knee arthroscopy irrigation and debridement      Plan:  - No further orthopaedic surgical intervention planned at this time   - Soft dressings on LLE replaced this morning. Please maintain and keep clean and dry. Reinforce dressings as needed per nursing.  - WBAT to the LLE  -Continue to trend CRP; last  as of 9/23/2024  -Cultures of joint fluid taken on 9/24/2024 positive for strep pneumo  - Abx: Ceftriaxone; appreciate ID recommendations  - DVT ppx: Lovenox 30 mg twice daily  -Continue working with PT/OT  - Pain control and medical management per primary  - Ice and elevate extremity for pain and swelling  - Ok to discharge from an orthopedics perspective once medically stable   - Follow up with Dr. Hodgson 10/9/24   - Please contact Ortho on call with any questions        Chivo Magaña, DO  Orthopedic Surgery, PGY-1  [place], [place]

## 2024-09-26 NOTE — PROGRESS NOTES
[facility]  Office: [phone]  Yash Moreno DO, Terrence Arenas, DO, Christiano Looney DO, Alonzo Chaves, DO, Rony Kelsey MD, Ioana Blanchard MD, Shai Albert MD, Yas Boyle MD,  Diaz Handley MD, Srinivasa Lopez MD, Karime Madrigal MD,  Bulmaro Poon DO, Radhika Alaniz MD, Luigi nAtonio MD, Bassam Moreno DO, Esthela Moncada MD,  Saravanan Yates DO, Krissy Mares MD, Adrianna Sumner MD, Lesli Boles MD, Shahid Bashir MD,  Travis Rivers MD, Sherice Ramachandran MD, Slade Gates MD, Bobby Tejada MD, Glen Stanton MD, Abdi Tadeo MD, Malcom Sharpe, DO, Brian Aparicio DO, Tristin Nation DO, Toro Martínez MD, Shirley Waterhouse, CNP,  Nanci Iglesias CNP, Malcom Lucio, CNP,  Claudia Vigil, [facility], Linda Champagen, CNP, Ny Montano, CNP, Colleen Hernandez, CNP, Michell Gabriel, CNP, Laura Shin, PA-C, Radha Beasley, PA-C, Lamar Clemens, CNP, Austin Gregory, CNP,  Elissa Meza, CNP, Sylvia Collins, CNP, Deedee Foley, CNP, Esperanza Martinez, CNS, Eidlma Lassiter, CNP, Shellie Lopez, CNP, Tracy Schwab, CNP       [facility]      Daily Progress Note     Admit Date: 9/23/2024  Bed/Room No.  0342/0342-01  Admitting Physician : Shai Albert MD  Code Status :Full Code  Hospital Day:  LOS: 3 days   Chief Complaint:     Chief Complaint   Patient presents with    Leg Pain     Principal Problem:    Septic arthritis (HCC)  Active Problems:    Elevated blood pressure reading    Pneumococcal infection    Septicemia (HCC)  Resolved Problems:    * No resolved hospital problems. *    Subjective :        Interval History/Significant events :  09/26/24  Patient's significant other at bedside.  Patient was having diaphoresis, tachycardia yesterday.  Patient has been drinking vodka at home daily.  He had not had his drink for last 6 days.  Patient was started on Librium and is doing better today.  He did had nausea and episodes of emesis last night.  Patient also has been eating less.  sinus tenderness or frontal sinus tenderness.      Mouth/Throat:      Pharynx: No oropharyngeal exudate.   Eyes:      General: No scleral icterus.     Conjunctiva/sclera: Conjunctivae normal.      Pupils: Pupils are equal, round, and reactive to light.   Neck:      Thyroid: No thyromegaly.      Vascular: No JVD.   Cardiovascular:      Rate and Rhythm: Normal rate and regular rhythm.      Pulses:           Dorsalis pedis pulses are 2+ on the right side and 2+ on the left side.      Heart sounds: Normal heart sounds. No murmur heard.  Pulmonary:      Effort: Pulmonary effort is normal.      Breath sounds: Normal breath sounds. No wheezing or rales.   Abdominal:      Palpations: Abdomen is soft. There is no mass.      Tenderness: There is no abdominal tenderness.   Musculoskeletal:      Cervical back: Full passive range of motion without pain and neck supple.      Comments: Left knee dressing in place.   Feet:      Comments: Callous present   Lymphadenopathy:      Head:      Right side of head: No submandibular adenopathy.      Left side of head: No submandibular adenopathy.      Cervical: No cervical adenopathy.   Skin:     General: Skin is warm.   Neurological:      Mental Status: He is alert and oriented to person, place, and time.      Motor: No tremor.   Psychiatric:         Behavior: Behavior is cooperative.           Laboratory findings:    Recent Labs     09/24/24  0449 09/25/24  0636 09/26/24  0604   WBC 14.3* 17.7* 15.4*   HGB 13.3 12.0* 12.2*   HCT 38.0* 36.7* 36.0*    290 327     Recent Labs     09/23/24  0930 09/24/24  0449 09/25/24  0636   * 135* 132*   K 3.6* 3.8 4.0   CL 92* 101 101   CO2 23 22 18*   GLUCOSE 199* 152* 154*   BUN 14 22* 17   CREATININE 1.1 1.1 0.9   CALCIUM 9.4 8.4* 8.4*     Recent Labs     09/24/24  0449   LABA1C 6.1*   CHOL 134   HDL 61   CHOLHDLRATIO 2.0   TRIG 115   VLDL 23          No results found for: \"SPECGRAV\", \"PROTEINU\", \"RBCUA\", \"BLOODU\", \"BACTERIA\", \"NITRU\",

## 2024-09-26 NOTE — PROGRESS NOTES
Physical Therapy        Physical Therapy Cancel Note      DATE: 2024    NAME: Elizabet Wolff  MRN: [id number]   : 1980      Patient not seen this date for Physical Therapy due to:    Patient Declined: Pt declined this AM stating fatigued, throwing up through the night would like to rest. Nursing addressing medical status. PT to further attempt this PM.      Electronically signed by Whitley Garcia PT on 2024 at 8:57 AM

## 2024-09-26 NOTE — PROGRESS NOTES
ATTESTATION:     I have discussed the case, including pertinent history and exam findings with the medical resident. I have evaluated the  History, physical findings and pictures of the patient and the key elements of the encounter have been performed by me. I have reviewed the laboratory data, other diagnostic studies and discussed them with the medical resident. I have updated the medical record where necessary.     I agree with the assessment, plan and orders as documented by the medical resident and I have modified them as necessary.      Elements of Medical Decision Making:  Note: I have independently performed the steps listed below as part of the medical decision making and evaluation.   Examined and discussed with patient.  Septic arthritis of the left knee  Reactive leukocytosis  Most likely preceding septicemia which seeded the knee joint  Labs, medications, radiologic studies were reviewed with personal review of films  Radiologic studies  Lab work  Cultures  Synovial fluid left knee 9/23/2024 Streptococcus pneumoniae  Large amounts of data were reviewed  Patient presented through the emergency room on 9/24/2024 with progressive swelling of the left knee  He had called the EMS service because of inability to ambulate  He denied any prior trauma to the left knee but indicated that he does have gout in the past which had produced a swelling of other joints  He denied fevers but indicated that he had some chills at home  On physical examination he had an acute large left knee effusion with reduced range of motion  And arthrocentesis yielded 7 9 mL of purulent synovial fluid.  Culture of the synovial fluid has yielded the Streptococcus pneumoniae  Patient had initially been treated with vancomycin and Rocephin  He underwent I&D in the OR on 9/24/2024.  Cultures from the OR are showing Streptococcus pneumonia  White count and CRP are both elevated  Discussed with nursing Staff, Discharge planner  Dr Albert's  cardiopulmonary process    X-ray left knee: 9/23/2024  Large joint effusion  No acute osseous abnormality    Lower extremity duplex: 9/23/2024  No evidence of DVT    I have personally reviewed the past medical history, past surgical history, medications, social history, and family history, and I have updated the database accordingly.  Past Medical History:     Past Medical History:   Diagnosis Date    Diabetic infection of right foot (HCC) 02/2022    Osteomyelitis of left foot (HCC) 2022    Peripheral neuropathy     Type 2 diabetes mellitus with diabetic neuropathy (HCC)        Past Surgical  History:     Past Surgical History:   Procedure Laterality Date    INCISION AND DRAINAGE OF WOUND Left 09/24/2024    KNEE ARTHROSCOPY IRRIGATION AND DEBRIDEMENT    KNEE ARTHROSCOPY Left 9/24/2024    KNEE ARTHROSCOPY IRRIGATION AND DEBRIDEMENT performed by Faustino Hodgson DO at [facility] OR       Medications:      cefTRIAXone (ROCEPHIN) IV  2,000 mg IntraVENous Q24H    chlordiazePOXIDE  10 mg Oral TID    sodium chloride flush  5-40 mL IntraVENous 2 times per day    sodium chloride flush  5-40 mL IntraVENous 2 times per day    enoxaparin  30 mg SubCUTAneous BID    insulin lispro  0-8 Units SubCUTAneous TID WC    insulin lispro  0-4 Units SubCUTAneous Nightly    pantoprazole  40 mg Oral QAM AC       Social History:     Social History     Socioeconomic History    Marital status: Unknown     Spouse name: Not on file    Number of children: Not on file    Years of education: Not on file    Highest education level: Not on file   Occupational History    Not on file   Tobacco Use    Smoking status: Not on file    Smokeless tobacco: Not on file   Vaping Use    Vaping status: Every Day    Substances: Nicotine   Substance and Sexual Activity    Alcohol use: Not on file    Drug use: Not on file    Sexual activity: Not on file   Other Topics Concern    Not on file   Social History Narrative    Not on file     Social Determinants of Health

## 2024-09-26 NOTE — PLAN OF CARE
Problem: Safety - Adult  Goal: Free from fall injury  9/25/2024 2122 by Radha Ashley RN  Outcome: Progressing  9/25/2024 1829 by Nichelle Palacio RN  Outcome: Progressing     Problem: Pain  Goal: Verbalizes/displays adequate comfort level or baseline comfort level  9/25/2024 2122 by Radha Ashley RN  Outcome: Progressing  9/25/2024 1829 by Nichelle Palacio RN  Outcome: Progressing     Problem: ABCDS Injury Assessment  Goal: Absence of physical injury  9/25/2024 2122 by Radha Ashley RN  Outcome: Progressing  9/25/2024 1829 by Nichelle Palacio RN  Outcome: Progressing     Problem: Chronic Conditions and Co-morbidities  Goal: Patient's chronic conditions and co-morbidity symptoms are monitored and maintained or improved  9/25/2024 2122 by Radha Ashley RN  Outcome: Progressing  9/25/2024 1829 by Nichelle Palacio RN  Outcome: Progressing

## 2024-09-26 NOTE — PLAN OF CARE
Problem: Safety - Adult  Goal: Free from fall injury  Outcome: Progressing     Problem: Pain  Goal: Verbalizes/displays adequate comfort level or baseline comfort level  Outcome: Progressing     Problem: ABCDS Injury Assessment  Goal: Absence of physical injury  Outcome: Progressing     Problem: Chronic Conditions and Co-morbidities  Goal: Patient's chronic conditions and co-morbidity symptoms are monitored and maintained or improved  Outcome: Progressing

## 2024-09-26 NOTE — CARE COORDINATION
Transitional planning: Spoke with patient and girlfriend regarding potential need for walker for discharge. Patient currently is a self pay. Provided the [facility] phone number to girlfriend to contact regarding obtaining a walker, noting that they are not open on the weekend and close early on Fridays.  Writer also suggested speaking with family and friends to see if anyone has a walker that he could borrow. She states he has insurance but there is a problem with it. Explained that unless it is coming up current and we have the ID #, etc writer can not obtain a walker for him paid by insurance. She states understanding.    [id number] Patient /GF provided current insurance card. Copy made and faxed to registration. Copy placed in soft chart.

## 2024-09-27 VITALS
RESPIRATION RATE: 17 BRPM | WEIGHT: 250 LBS | OXYGEN SATURATION: 97 % | TEMPERATURE: 97.9 F | HEIGHT: 72 IN | HEART RATE: 72 BPM | BODY MASS INDEX: 33.86 KG/M2 | DIASTOLIC BLOOD PRESSURE: 93 MMHG | SYSTOLIC BLOOD PRESSURE: 134 MMHG

## 2024-09-27 LAB
CRP SERPL HS-MCNC: 218 MG/L (ref 0–5)
ERYTHROCYTE [DISTWIDTH] IN BLOOD BY AUTOMATED COUNT: 16.4 % (ref 11.8–14.4)
GLUCOSE BLD-MCNC: 173 MG/DL (ref 75–110)
GLUCOSE BLD-MCNC: 184 MG/DL (ref 75–110)
HCT VFR BLD AUTO: 35.1 % (ref 40.7–50.3)
HGB BLD-MCNC: 12.3 G/DL (ref 13–17)
MCH RBC QN AUTO: 32.8 PG (ref 25.2–33.5)
MCHC RBC AUTO-ENTMCNC: 35 G/DL (ref 28.4–34.8)
MCV RBC AUTO: 93.6 FL (ref 82.6–102.9)
NRBC BLD-RTO: 0 PER 100 WBC
PLATELET # BLD AUTO: 445 K/UL (ref 138–453)
PMV BLD AUTO: 11.2 FL (ref 8.1–13.5)
RBC # BLD AUTO: 3.75 M/UL (ref 4.21–5.77)
WBC OTHER # BLD: 11.6 K/UL (ref 3.5–11.3)

## 2024-09-27 PROCEDURE — 6370000000 HC RX 637 (ALT 250 FOR IP): Performed by: FAMILY MEDICINE

## 2024-09-27 PROCEDURE — 97116 GAIT TRAINING THERAPY: CPT

## 2024-09-27 PROCEDURE — 6370000000 HC RX 637 (ALT 250 FOR IP)

## 2024-09-27 PROCEDURE — 99238 HOSP IP/OBS DSCHRG MGMT 30/<: CPT | Performed by: FAMILY MEDICINE

## 2024-09-27 PROCEDURE — 97530 THERAPEUTIC ACTIVITIES: CPT

## 2024-09-27 PROCEDURE — 36415 COLL VENOUS BLD VENIPUNCTURE: CPT

## 2024-09-27 PROCEDURE — 86140 C-REACTIVE PROTEIN: CPT

## 2024-09-27 PROCEDURE — 2580000003 HC RX 258: Performed by: ANESTHESIOLOGY

## 2024-09-27 PROCEDURE — 99232 SBSQ HOSP IP/OBS MODERATE 35: CPT | Performed by: INTERNAL MEDICINE

## 2024-09-27 PROCEDURE — 82947 ASSAY GLUCOSE BLOOD QUANT: CPT

## 2024-09-27 PROCEDURE — 85027 COMPLETE CBC AUTOMATED: CPT

## 2024-09-27 RX ORDER — LEVOFLOXACIN 750 MG/1
750 TABLET, FILM COATED ORAL DAILY
Qty: 14 TABLET | Refills: 0 | Status: SHIPPED | OUTPATIENT
Start: 2024-09-27 | End: 2024-10-11

## 2024-09-27 RX ORDER — OXYCODONE AND ACETAMINOPHEN 5; 325 MG/1; MG/1
1 TABLET ORAL EVERY 6 HOURS PRN
Qty: 20 TABLET | Refills: 0 | Status: SHIPPED | OUTPATIENT
Start: 2024-09-27 | End: 2024-10-02

## 2024-09-27 RX ORDER — CHLORDIAZEPOXIDE HYDROCHLORIDE 10 MG/1
10 CAPSULE, GELATIN COATED ORAL 3 TIMES DAILY
Qty: 12 CAPSULE | Refills: 0 | Status: SHIPPED | OUTPATIENT
Start: 2024-09-27 | End: 2024-10-01

## 2024-09-27 RX ORDER — LEVOFLOXACIN 750 MG/1
750 TABLET, FILM COATED ORAL DAILY
Status: DISCONTINUED | OUTPATIENT
Start: 2024-09-27 | End: 2024-09-27 | Stop reason: HOSPADM

## 2024-09-27 RX ADMIN — SODIUM CHLORIDE, PRESERVATIVE FREE 10 ML: 5 INJECTION INTRAVENOUS at 08:56

## 2024-09-27 RX ADMIN — LEVOFLOXACIN 750 MG: 750 TABLET, FILM COATED ORAL at 11:38

## 2024-09-27 RX ADMIN — OXYCODONE HYDROCHLORIDE AND ACETAMINOPHEN 2 TABLET: 5; 325 TABLET ORAL at 10:10

## 2024-09-27 RX ADMIN — PANTOPRAZOLE SODIUM 40 MG: 40 TABLET, DELAYED RELEASE ORAL at 08:56

## 2024-09-27 RX ADMIN — CHLORDIAZEPOXIDE HYDROCHLORIDE 10 MG: 10 CAPSULE ORAL at 08:57

## 2024-09-27 ASSESSMENT — PAIN DESCRIPTION - ORIENTATION: ORIENTATION: LEFT

## 2024-09-27 ASSESSMENT — PAIN SCALES - GENERAL
PAINLEVEL_OUTOF10: 5
PAINLEVEL_OUTOF10: 8

## 2024-09-27 ASSESSMENT — ENCOUNTER SYMPTOMS
DIARRHEA: 0
SINUS PRESSURE: 0
BACK PAIN: 0
WHEEZING: 0
CHOKING: 0
RHINORRHEA: 0
SHORTNESS OF BREATH: 0
CHEST TIGHTNESS: 0
VOMITING: 1
COUGH: 0
CONSTIPATION: 0
VOICE CHANGE: 0
NAUSEA: 1
ABDOMINAL PAIN: 0

## 2024-09-27 ASSESSMENT — PAIN DESCRIPTION - LOCATION
LOCATION: NECK
LOCATION: LEG;KNEE

## 2024-09-27 ASSESSMENT — PAIN DESCRIPTION - DESCRIPTORS
DESCRIPTORS: ACHING
DESCRIPTORS: ACHING;DISCOMFORT

## 2024-09-27 NOTE — DISCHARGE SUMMARY
Discharge order received. IV removed. Writer went over d/c instructions with pt, including medications, wound care and follow up appointments. Pt verbalized understanding. Pt was d/c with all documented belongings.

## 2024-09-27 NOTE — PLAN OF CARE
Problem: Safety - Adult  Goal: Free from fall injury  Outcome: Adequate for Discharge     Problem: Pain  Goal: Verbalizes/displays adequate comfort level or baseline comfort level  Outcome: Adequate for Discharge     Problem: ABCDS Injury Assessment  Goal: Absence of physical injury  Outcome: Adequate for Discharge     Problem: Chronic Conditions and Co-morbidities  Goal: Patient's chronic conditions and co-morbidity symptoms are monitored and maintained or improved  Outcome: Adequate for Discharge

## 2024-09-27 NOTE — CARE COORDINATION
Transitional planning: Medication Assistance voucher faxed to OP pharmacy for DC medications. Patient has obtained walker from family member.    Discharge Report    [facility]  Clinical Case Management Department  Written by: Vashti Erazo RN    Patient Name: Elizabet Wolff  Attending Provider: Shai Albert MD  Admit Date: 2024  9:19 AM  MRN: [id number]  Account: [id number]                     : 1980  Discharge Date: 2024      Disposition: home    Vashti Erazo RN

## 2024-09-27 NOTE — PROGRESS NOTES
[facility]  Office: [phone]  Yash Moreno DO, Terrence Arenas, DO, Christiano Looney DO, Alonzo Chaves, DO, Rony Kelsey MD, Ioana Blanchard MD, Shai Albert MD, Yas Boyle MD,  Diaz Handley MD, Srinivasa Lopez MD, Karime Madrigal MD,  Bulmaro Poon DO, Radhika Alaniz MD, Luigi Antonio MD, Bassam Moreno DO, Esthela Moncada MD,  Saravanan Yates DO, Krissy Mares MD, Adrianna Sumner MD, Lesli Boles MD, Shahid Bashir MD,  Travis Rivers MD, Sherice Ramachandran MD, Slade Gates MD, Bobby Tejada MD, Glen Stanton MD, Abdi Tadeo MD, Malcom Sharpe, DO, Brian Aparicio DO, Tristin Nation DO, Toro Martínez MD, Shirley Waterhouse, CNP,  Nanci Iglesias CNP, Malcom Lucio, CNP,  Claudia Vigil, [facility], Linda Champagne, CNP, Ny Montano, CNP, Colleen Hernandez, CNP, Michell Gabriel, CNP, Laura Shin, PA-C, Radha Beasley, PA-C, Lamar Clemens, CNP, Austin Gregory, CNP,  Elissa Meza, CNP, Sylvia Collins, CNP, Deedee Foley, CNP, Esperanza Martinez, CNS, Edilma Lassiter, CNP, Shellie Lopez, CNP, Tracy Schwab, CNP       [facility]      Daily Progress Note     Admit Date: 9/23/2024  Bed/Room No.  0342/0342-01  Admitting Physician : Shai Albert MD  Code Status :Full Code  Hospital Day:  LOS: 4 days   Chief Complaint:     Chief Complaint   Patient presents with    Leg Pain     Principal Problem:    Septic arthritis (HCC)  Active Problems:    Elevated blood pressure reading    Pneumococcal infection    Septicemia (HCC)    Alcohol withdrawal syndrome with complication (HCC)  Resolved Problems:    * No resolved hospital problems. *    Subjective :        Interval History/Significant events :  09/27/24  Patient is alert, oriented.  He is denying any hallucination, tremors.  Perspiration and sweating is resolved.  He is alert oriented.  He is sitting in the bed.  Patient is on IV antibiotics.  Vitals - Temp:  afebrile ,  Heart Rate - Normal Cardiac Rhythm - regular rate and  frontal sinus tenderness.      Left Sinus: No maxillary sinus tenderness or frontal sinus tenderness.      Mouth/Throat:      Pharynx: No oropharyngeal exudate.   Eyes:      General: No scleral icterus.     Conjunctiva/sclera: Conjunctivae normal.      Pupils: Pupils are equal, round, and reactive to light.   Neck:      Thyroid: No thyromegaly.      Vascular: No JVD.   Cardiovascular:      Rate and Rhythm: Normal rate and regular rhythm.      Pulses:           Dorsalis pedis pulses are 2+ on the right side and 2+ on the left side.      Heart sounds: Normal heart sounds. No murmur heard.  Pulmonary:      Effort: Pulmonary effort is normal.      Breath sounds: Normal breath sounds. No wheezing or rales.   Abdominal:      Palpations: Abdomen is soft. There is no mass.      Tenderness: There is no abdominal tenderness.   Musculoskeletal:      Cervical back: Full passive range of motion without pain and neck supple.      Comments: Left knee dressing in place.   Feet:      Comments: Callous present   Lymphadenopathy:      Head:      Right side of head: No submandibular adenopathy.      Left side of head: No submandibular adenopathy.      Cervical: No cervical adenopathy.   Skin:     General: Skin is warm.   Neurological:      Mental Status: He is alert and oriented to person, place, and time.      Motor: No tremor.   Psychiatric:         Behavior: Behavior is cooperative.           Laboratory findings:    Recent Labs     09/25/24  0636 09/26/24  0604   WBC 17.7* 15.4*   HGB 12.0* 12.2*   HCT 36.7* 36.0*    327     Recent Labs     09/25/24  0636 09/26/24  0604   * 133*   K 4.0 3.5*    99   CO2 18* 20   GLUCOSE 154* 153*   BUN 17 14   CREATININE 0.9 0.7   MG  --  2.4   CALCIUM 8.4* 8.5*     No results for input(s): \"LABALBU\", \"LABA1C\", \"H4TSAAQ\", \"FT4\", \"TSH\", \"AST\", \"ALT\", \"LDH\", \"GGT\", \"ALKPHOS\", \"BILITOT\", \"BILIDIR\", \"AMMONIA\", \"AMYLASE\", \"LIPASE\", \"LACTATE\", \"CHOL\", \"HDL\", \"CHOLHDLRATIO\", \"TRIG\",  \"VLDL\", \"BNP\", \"TROPONINI\", \"CKTOTAL\", \"CKMB\", \"CKMBINDEX\", \"RF\", \"IRAIDA\" in the last 72 hours.    Invalid input(s): \"PROT\", \"U6ZECTA\", \"LDLCHOLESTEROL\"         No results found for: \"SPECGRAV\", \"PROTEINU\", \"RBCUA\", \"BLOODU\", \"BACTERIA\", \"NITRU\", \"WBCUA\", \"LEUKOCYTESUR\"    Imaging / Clinical Data :-   XR CHEST PORTABLE   Final Result   No acute cardiopulmonary process.         Vascular duplex lower extremity venous bilateral   Final Result      XR KNEE LEFT (3 VIEWS)   Final Result   1. Large joint effusion.  Septic joint is difficult to exclude on the basis   of this study.   2. Mild degenerative changes of the medial and patellofemoral compartments.   3. No acute osseous abnormality.            Hemoglobin A1C   Date Value Ref Range Status   09/24/2024 6.1 (H) 4.0 - 6.0 % Final       Clinical Course : stable  Assessment and Plan  :        Left knee septic arthritis -strep pneumoniae heavy growth  On Rocephin.  ID consulted and recommended oral Levaquin for 15 days.  S/p arthroscopic irrigation and drainage on 9/24/2024.  Orthopedic surgery and ID following.  Weightbearing as tolerated.  Use walker as needed.  Patient does not want any narcotic medication.  Use Tylenol as needed.  Outpatient follow-up with Dr. Hodgson.  Type 2 diabetes mellitus with peripheral neuropathy and prior history of right  diabetic foot infection -diet controlled.  Last A1c 6.1.  Current smoker-recommend abstinence.  Obesity BMI 33.9-recommend lifestyle treaters.  Prior history of diabetic foot infection and osteomyelitis in 2022.  Alcohol abuse with alcohol withdrawal syndrome.  Librium 10 mg 3 times daily for 4 days.  Recommend complete abstinence.    Discharge home.  Outpatient follow-up with Ortho and infectious disease.  Plan and updates discussed with patient , and his significant other at bedside answers  explained to satisfaction.   Plan discussed with Staff Kristin HORTA.      (This note is created with the assistance of a speech

## 2024-09-27 NOTE — PLAN OF CARE
Problem: Safety - Adult  Goal: Free from fall injury  9/26/2024 2031 by Radha Ashley RN  Outcome: Progressing  9/26/2024 1809 by Kristin Hobbs RN  Outcome: Progressing     Problem: Pain  Goal: Verbalizes/displays adequate comfort level or baseline comfort level  9/26/2024 2031 by Radha Ashley RN  Outcome: Progressing  9/26/2024 1809 by Kristin Hobbs RN  Outcome: Progressing     Problem: ABCDS Injury Assessment  Goal: Absence of physical injury  9/26/2024 2031 by Radha Ashley RN  Outcome: Progressing  9/26/2024 1809 by Kristin Hobbs RN  Outcome: Progressing     Problem: Chronic Conditions and Co-morbidities  Goal: Patient's chronic conditions and co-morbidity symptoms are monitored and maintained or improved  9/26/2024 2031 by Radha Ashley RN  Outcome: Progressing  9/26/2024 1809 by Kristin Hobbs RN  Outcome: Progressing

## 2024-09-27 NOTE — PROGRESS NOTES
CLINICAL PHARMACY NOTE: MEDS TO BEDS    Total # of Prescriptions Filled: 3   The following medications were delivered to the patient:  Chlordiazepoxide  Percocet  levofloxacin    Additional Documentation:  $29.96 eunice - henry  Writer delivered to bedside

## 2024-09-27 NOTE — PROGRESS NOTES
Physical Therapy  Facility/Department: [address] MED SURG  Physical Therapy Daily Treatment Note    Name: Elizabet Wolff  : 1980  MRN: [id number]  Date of Service: 2024    Discharge Recommendations:  Patient would benefit from continued therapy after discharge   PT Equipment Recommendations  Equipment Needed: Yes  Mobility Devices: Walker  Walker: Rolling      Patient Diagnosis(es): The primary encounter diagnosis was Pyogenic arthritis of left knee joint, due to unspecified organism. A diagnosis of Alcohol withdrawal syndrome with complication (HCC) was also pertinent to this visit.  Past Medical History:  has a past medical history of Diabetic infection of right foot (HCC), Osteomyelitis of left foot (HCC), Peripheral neuropathy, and Type 2 diabetes mellitus with diabetic neuropathy (HCC).  Past Surgical History:  has a past surgical history that includes Incision and drainage of wound (Left, 2024) and Knee arthroscopy (Left, 2024).    Assessment  Body Structures, Functions, Activity Limitations Requiring Skilled Therapeutic Intervention: Decreased functional mobility ;Decreased balance;Decreased ROM;Decreased strength;Decreased posture;Increased pain;Decreased endurance  Assessment: Pt demonstrates some improvements to functional mobility, requiring CGA to ambulate 40 feet with a RW, min-A to negotiate 4 stairs with bilateral handrails.  Pt continues to demonstrate difficulty bearing weight through the LLE this date.  Pt would benefit from additional PT upon discharge to further address endurance, balance, and LLE strength deficits.  Pt would benefit from assistance with mobility upon discharge.  Therapy Prognosis: Good  Requires PT Follow-Up: Yes  Activity Tolerance  Activity Tolerance: Patient limited by pain;Patient limited by endurance    Plan  Physical Therapy Plan  General Plan:  (5-6x per week)  Current Treatment Recommendations: Strengthening, ROM, Balance training, Equipment evaluation,  understanding      Therapy Time   Individual Concurrent Group Co-treatment   Time In 1000; 1020         Time Out 1007; 1036         Minutes 23         Timed Code Treatment Minutes: 23 Minutes       Moe Pulido PT

## 2024-09-27 NOTE — DISCHARGE SUMMARY
[facility]  Office: [phone]  Yash Moreno DO, Terrence Arenas DO, Christiano Looney DO, Alonzo Chaves DO, Rony Kelsey MD, Ioana Blanchard MD, Shai Albert MD, Yas Boyle MD,  Diaz Handley MD, Srinivasa Lopez MD, Tristin Nation DO, Karime Madrigal MD,  Bulmaro Poon DO, Radhika Alaniz MD, Luigi Antonio MD, Bassam Moreno DO, Esthela Moncada MD,  Saravanan Yates DO, Krissy Mares MD, Adrianna Sumner MD, Lesli Boles MD, Shahid Bashir MD,  Travis Rivers MD, Sherice Ramachandran MD, Slade Gates MD, Bobby Tejada MD, Brian Aparicio DO, Lemuel Morillo MD,  Toro Martínez MD, Glen Stanton MD, Shirley Waterhouse, LISBET,  Nanci Iglesias CNP,, Malcom Lucio, CNP,  Claudia Vigil, DNP, Linda Champagne, CNP, Ny Montano, CNP, Deedee Foley CNP, Colleen Hernandez CNP, Michell Gabriel CNP, Sylvia Collins, CNP, Esperanza Martinez, CNS, Edilma Lassiter, CNP, Shellie Lopez, CNP                  [facility]      Discharge Summary     Patient ID: Elizabet Wolff  :  1980   MRN: [id number]     ACCOUNT:  [id number]   Patient Location : 034  Patient's PCP: No primary care provider on file.  Admit Date: 2024   Discharge Date: 2024     Length of Stay: 4  Code Status:  Prior  Admitting Physician: Shai Albert MD  Discharge Physician: Shai Albert MD     Active Discharge Diagnosis :     Primary Problem  Septic arthritis      Hospital Problems  Active Hospital Problems    Diagnosis Date Noted    Alcohol withdrawal syndrome with complication (HCC) [F10.939] 2024    Pneumococcal infection [A49.1] 2024    Septicemia (HCC) [A41.9] 2024    Septic arthritis (HCC) [M00.9] 2024    Elevated blood pressure reading [R03.0] 2024       Admission Condition:  fair     Discharged Condition: stable    Hospital Stay:     Hospital Course:  Elizabet Wolff is a 44 y.o. male who was admitted for the management of   Septic arthritis , presented to ER with  advised to follow up with ID and Ortho,     Diet: diabetic diet    Activity: As tolerated.    Instructions to Patient: complete alcohol abstinence,.     Discharge Medications:      Medication List        START taking these medications      chlordiazePOXIDE 10 MG capsule  Commonly known as: LIBRIUM  Take 1 capsule by mouth 3 times daily for 4 days. Max Daily Amount: 30 mg     levoFLOXacin 750 MG tablet  Commonly known as: Levaquin  Take 1 tablet by mouth daily for 14 days     oxyCODONE-acetaminophen 5-325 MG per tablet  Commonly known as: Percocet  Take 1 tablet by mouth every 6 hours as needed for Pain for up to 5 days. Intended supply: 5 days. Take lowest dose possible to manage pain Max Daily Amount: 4 tablets               Where to Get Your Medications        These medications were sent to [address] -  [phone] - F [phone]  [address] [place] [address]      Phone: [phone]   chlordiazePOXIDE 10 MG capsule  levoFLOXacin 750 MG tablet  oxyCODONE-acetaminophen 5-325 MG per tablet         Time Spent on discharge is  33 mins in patient examination, evaluation, counseling as well as medication reconciliation, prescriptions for required medications, discharge plan and follow up.    Electronically signed by   Shai Albert MD  9/27/2024        Thank you  No primary care provider on file. for the opportunity to be involved in this patient's care.     (This note is created with the assistance of a speech recognition program. While intending to generate a document that actually reflects the content of the visit, the document can still have some errors including those of syntax and sound a like substitutions which may escape proof reading. In such instances, actual meaning can be extrapolated by contextual diversion.)

## 2024-09-27 NOTE — PROGRESS NOTES
Occupational Therapy  Facility/Department: [address] MED SURG   Daily Treatment Note  Patient Name: Elizabet Wolff        MRN: [id number]    : 1980    Date of Service: 2024    Discharge Recommendations  Discharge Recommendations: Patient would benefit from continued therapy after discharge    OT Equipment Recommendations  Equipment Needed: Yes  Walker: Rolling  ADL Assistive Devices: Transfer Tub Bench;Reacher;Long-handled Sponge;Long-handled Shoe Horn;Sock-Aid Hard    Assessment  Performance deficits / Impairments: Decreased functional mobility ;Decreased ADL status;Decreased endurance;Decreased balance;Decreased high-level IADLs  Prognosis: Good  Decision Making: Medium Complexity  REQUIRES OT FOLLOW-UP: Yes  Activity Tolerance  Activity Tolerance: Patient Tolerated treatment well  Safety Devices  Type of Devices: Call light within reach;Gait belt;Nurse notified;Left in bed  Restraints  Restraints Initially in Place: No    Restrictions/Precautions  Restrictions/Precautions  Restrictions/Precautions: Weight Bearing  Required Braces or Orthoses?: No  Lower Extremity Weight Bearing Restrictions  Left Lower Extremity Weight Bearing: Weight Bearing As Tolerated  Position Activity Restriction  Other position/activity restrictions: s/p: Knee arthroscopy irrigation and debridement .    Subjective  General  Family / Caregiver Present: Yes (Significant other present.)  General Comment  Comments: RN ok'd pt for OT this date. Pt agreed to session and was pleasant/cooperative throughout. Pt reports pain of 7/10 to LLE and was assisted with increased activity and repositioning for optimal comfort at end of session with good return  Pain Screening  Pain at present: 7    Objective  Orientation  Overall Orientation Status: Within Functional Limits  Orientation Level: Oriented X4  Cognition  Overall Cognitive Status: WFL    Activities of Daily Living  LE Dressing: Minimal assistance;Setup  LE Dressing Skilled Clinical

## 2024-09-27 NOTE — PROGRESS NOTES
Collected: 09/25/24 1615    Order Status: Completed Updated: 09/26/24 0735     Intervention Duration of Therapy     Comment: Targeted therapy       Infectious Disease Intervention [[id number]] Collected: 09/25/24 1615    Order Status: Canceled     Culture, Blood 1 [[id number]] Collected: 09/25/24 1424    Order Status: Completed Specimen: Blood Updated: 09/26/24 1441     Specimen Description .BLOOD     Special Requests RIGHT WRIST 3ML     Culture NO GROWTH 1 DAY    Culture, Blood 1 [[id number]] Collected: 09/25/24 1419    Order Status: Completed Specimen: Blood Updated: 09/26/24 1441     Specimen Description .BLOOD     Special Requests R AC 2ML     Culture NO GROWTH 1 DAY    Culture, Fungus [[id number]] Collected: 09/24/24 0803    Order Status: No result Specimen: Body Fluid from Joint, Knee     Culture, Body Fluid [[id number]] Collected: 09/24/24 0803    Order Status: No result Specimen: Body Fluid from Joint, Knee     Culture, Anaerobic and Aerobic [[id number]] Collected: 09/24/24 0803    Order Status: No result Specimen: Body Fluid from Joint, Knee     Culture, Fungus [[id number]] Collected: 09/24/24 0740    Order Status: Completed Specimen: Joint, Knee Updated: 09/25/24 1121     Specimen Description .KNEE, JOINT LEFT FLUID     Direct Exam NO FUNGAL ELEMENTS SEEN     Culture PENDING    Culture, Anaerobic and Aerobic [[id number]]  (Abnormal) Collected: 09/24/24 0740    Order Status: Completed Specimen: Joint, Knee Updated: 09/27/24 0807     Specimen Description .KNEE, JOINT LEFT FLUID     Direct Exam MANY NEUTROPHILS      MANY GRAM POSITIVE COCCI IN PAIRS     Culture STREPTOCOCCUS PNEUMONIAE MODERATE GROWTH For susceptibility, refer to previous culture. Identification by MALDI-TOF      No anaerobic organisms isolated at 3 days.    C.trachomatis N.gonorrhoeae DNA, Urine [[id number]] Collected: 09/23/24 1504    Order Status: Completed Specimen: Urine Updated: 09/24/24 1001     Specimen Description .URINE     C.  [phone] - Office: [phone]

## 2024-09-29 LAB
MICROORGANISM SPEC CULT: ABNORMAL
MICROORGANISM SPEC CULT: ABNORMAL
MICROORGANISM SPEC CULT: NORMAL
MICROORGANISM SPEC CULT: NORMAL
MICROORGANISM/AGENT SPEC: ABNORMAL
MICROORGANISM/AGENT SPEC: ABNORMAL
SERVICE CMNT-IMP: NORMAL
SERVICE CMNT-IMP: NORMAL
SPECIMEN DESCRIPTION: ABNORMAL
SPECIMEN DESCRIPTION: NORMAL
SPECIMEN DESCRIPTION: NORMAL

## 2024-09-30 LAB
MICROORGANISM SPEC CULT: NORMAL
MICROORGANISM/AGENT SPEC: NORMAL
SERVICE CMNT-IMP: NORMAL
SERVICE CMNT-IMP: NORMAL
SPECIMEN DESCRIPTION: NORMAL

## 2024-10-07 LAB
MICROORGANISM SPEC CULT: NORMAL
MICROORGANISM/AGENT SPEC: NORMAL
SPECIMEN DESCRIPTION: NORMAL

## 2024-10-09 ENCOUNTER — OFFICE VISIT (OUTPATIENT)
Dept: ORTHOPEDIC SURGERY | Age: 44
End: 2024-10-09

## 2024-10-09 VITALS — BODY MASS INDEX: 33.86 KG/M2 | WEIGHT: 250 LBS | HEIGHT: 72 IN

## 2024-10-09 DIAGNOSIS — M00.9 PYOGENIC ARTHRITIS OF LEFT KNEE JOINT, DUE TO UNSPECIFIED ORGANISM: ICD-10-CM

## 2024-10-09 PROCEDURE — 99024 POSTOP FOLLOW-UP VISIT: CPT | Performed by: STUDENT IN AN ORGANIZED HEALTH CARE EDUCATION/TRAINING PROGRAM

## 2024-10-09 RX ORDER — OXYCODONE AND ACETAMINOPHEN 5; 325 MG/1; MG/1
1 TABLET ORAL EVERY 6 HOURS PRN
Qty: 20 TABLET | Refills: 0 | Status: SHIPPED | OUTPATIENT
Start: 2024-10-09 | End: 2024-10-14

## 2024-10-09 NOTE — PROGRESS NOTES
[facility]  [address]  [place] [address]  Dept Phone: [phone]  Dept Fax: [phone]      Orthopaedic Trauma Clinic Follow Up      Subjective:   Date of Surgery: 9/24/2024    Elizabet Wolff is a 44 y.o. year old male who presents to the clinic today for follow up arthroscopic irrigation debridement for left septic knee arthritis.  Patient's cultures grew Streptococcus pneumonia.  Patient still on antibiotics per infectious disease.  Patient's family member present, states she has not scheduled him for his infectious disease appointment yet.  Patient has been ambulating with pain.  Patient continues to use assistive devices to ambulate.  Patient currently not in therapy.  No new injuries or falls.  Having difficulty with knee flexion extension.    Review of Systems  Gen: no fever, chills, malaise  CV: no chest pain or palpitations  Resp: no cough or shortness of breath  GI: no nausea, vomiting, diarrhea, or constipation  Neuro: no seizures, vertigo, or headache  Msk: Per HPI  10 remaining systems reviewed and negative    Objective :   There were no vitals filed for this visit.Body mass index is 33.91 kg/m².  General: No acute distress, resting comfortably in the clinic  Neuro: alert. oriented  Eyes: Extra-ocular muscles intact  Pulm: Respirations unlabored and regular.  Skin: warm, well perfused  Psych:   Patient has good fund of knowledge and displays understanding of exam, diagnosis, and plan.  Left Lower Extremity: sutures in place. Wound well approximated. No erythema or signs of infection.  Generalized swelling, and effusion noted to the left knee.  Patient unable to fully straighten knee given pain.  Compartments soft/compressible. Extremity warm/well perfused. EHL/FHL/TA/GSC motor intact. Patient expresses normal sensation L3-S1 distribution     Radiology:  Imaging studies from today were independently reviewed and read as listed below. Any relevant images obtained

## 2024-10-15 LAB
MICROORGANISM SPEC CULT: NORMAL
MICROORGANISM/AGENT SPEC: NORMAL
SPECIMEN DESCRIPTION: NORMAL

## 2024-10-15 NOTE — PROGRESS NOTES
[facility]  Office Visit Progress Note  Today's Date: 10/22/2024    ATTESTATION:     I have discussed the case, including pertinent history and exam findings with the medical resident. I have evaluated the  History, physical findings and pictures of the patient and the key elements of the encounter have been performed by me. I have reviewed the laboratory data, other diagnostic studies and discussed them with the medical resident. I have updated the medical record where necessary.     I agree with the assessment, plan and orders as documented by the medical resident and I have modified them as necessary.      Elements of Medical Decision Making:  Note: I have independently performed the steps listed below as part of the medical decision making and evaluation.   Examined and discussed with patient.  Septic arthritis of the left knee  Reactive leukocytosis  Most likely preceding septicemia which seeded the knee joint  Labs, medications, radiologic studies were reviewed with personal review of films  Radiologic studies  Lab work  Cultures  Synovial fluid left knee 9/23/2024 Streptococcus pneumoniae  Large amounts of data were reviewed  Patient presented through the emergency room on 9/24/2024 with progressive swelling of the left knee  He had called the EMS service because of inability to ambulate  He denied any prior trauma to the left knee but indicated that he does have gout in the past which had produced a swelling of other joints  He denied fevers but indicated that he had some chills at home  On physical examination he had an acute large left knee effusion with reduced range of motion  And arthrocentesis yielded 7 9 mL of purulent synovial fluid.  Culture of the synovial fluid has yielded the Streptococcus pneumoniae  Patient had initially been treated with vancomycin and Rocephin  He underwent I&D in the OR on 9/24/2024.  Cultures from the OR are showing Streptococcus pneumonia  White count

## 2024-10-21 LAB
MICROORGANISM SPEC CULT: NORMAL
MICROORGANISM/AGENT SPEC: NORMAL
SPECIMEN DESCRIPTION: NORMAL

## 2024-10-22 ENCOUNTER — HOSPITAL ENCOUNTER (OUTPATIENT)
Age: 44
Discharge: HOME | End: 2024-10-22

## 2024-10-22 ENCOUNTER — OFFICE VISIT (OUTPATIENT)
Dept: INFECTIOUS DISEASES | Age: 44
End: 2024-10-22

## 2024-10-22 VITALS
OXYGEN SATURATION: 100 % | DIASTOLIC BLOOD PRESSURE: 61 MMHG | HEIGHT: 72 IN | BODY MASS INDEX: 29.26 KG/M2 | HEART RATE: 63 BPM | SYSTOLIC BLOOD PRESSURE: 116 MMHG | WEIGHT: 216 LBS | TEMPERATURE: 97.1 F

## 2024-10-22 DIAGNOSIS — M00.162: ICD-10-CM

## 2024-10-22 DIAGNOSIS — M00.162: Primary | ICD-10-CM

## 2024-10-22 LAB — CRP SERPL HS-MCNC: 103 MG/L (ref 0–5)

## 2024-10-22 PROCEDURE — 36415 COLL VENOUS BLD VENIPUNCTURE: CPT

## 2024-10-22 PROCEDURE — 86140 C-REACTIVE PROTEIN: CPT

## 2024-10-22 PROCEDURE — 99212 OFFICE O/P EST SF 10 MIN: CPT | Performed by: INTERNAL MEDICINE

## 2024-10-22 RX ORDER — OXYCODONE AND ACETAMINOPHEN 5; 325 MG/1; MG/1
1 TABLET ORAL EVERY 4 HOURS PRN
COMMUNITY

## 2024-10-22 RX ORDER — MEDICAL SUPPLY, MISCELLANEOUS
EACH MISCELLANEOUS
Qty: 2 EACH | Refills: 0 | Status: SHIPPED | OUTPATIENT
Start: 2024-10-22

## 2024-10-22 RX ORDER — LEVOFLOXACIN 750 MG/1
750 TABLET, FILM COATED ORAL DAILY
Qty: 20 TABLET | Refills: 0 | Status: SHIPPED | OUTPATIENT
Start: 2024-10-22 | End: 2024-11-11

## 2024-10-22 RX ORDER — ACETAMINOPHEN 325 MG/1
325 TABLET ORAL EVERY 6 HOURS PRN
COMMUNITY

## 2024-10-22 NOTE — PROGRESS NOTES
[facility]  Office Visit Progress Note  Today's Date: 10/22/2024     [facility] - Progress Note   Today's Date and Time: 9/27/2024, 9:23 AM    Impression :   Septic arthritis of left knee  Leukocytosis  Elevated CRP  Leg edema    Recommendations:   Completed IV Rocephin  Completed Levaquin 750 mg p.o. x 14 days   New prescription for Levaquin 750 mg po q day. Started 10-22-24  Compression stockings to decrease edema  CRP 10-22-24  Office f/up in 4 weeks    Medical Decision Making/Summary/Discussion:9/27/2024       Infection Control Recommendations   [place] Precautions    Antimicrobial Stewardship Recommendations     Simplification of therapy  Targeted therapy  IV to oral conversion    Coordination of Outpatient Care:   Estimated Length of IV antimicrobials:TBD  Patient will need Midline Catheter Insertion: TBD  Patient will need PICC line Insertion:TBD  Patient will need: Home IV , Infusion Center,  SNF,  LTAC: TBD  Patient will need outpatient wound care: No    Chief complaint/reason for consultation:   Septic arthritis      History of Present Illness:   Elizabet Wolff is a 44 y.o.-year-old male who was seen in the office on 10/22/2024  . Patient seen at the request of Dr. Albetr.    INITIAL HISTORY:    Patient known to our service from prior admission to [facility] on 9/23/2024.    Patient presented to the ED 9/24/2024 for progressive left knee swelling.  Patient called EMS due to inability to ambulate. Patient denies any trauma to the left knee as well as history of gout or similar swelling to any other joint.  He denies constitutional symptoms however occasionally gets chills at home.      Patient was further evaluated and on physical exam his left knee is tender, painful and a large effusion was noted with decreased range of motion.  His white count was increased at 15.4 and an arthrocentesis was performed where 70 mL of purulent synovial

## 2024-10-28 LAB
MICROORGANISM SPEC CULT: NORMAL
MICROORGANISM/AGENT SPEC: NORMAL
SPECIMEN DESCRIPTION: NORMAL

## 2024-10-28 NOTE — PROGRESS NOTES
Updated: 09/26/24 1441     Specimen Description .BLOOD     Special Requests R AC 2ML     Culture NO GROWTH 1 DAY    Culture, Fungus [[id number]] Collected: 09/24/24 0803    Order Status: No result Specimen: Body Fluid from Joint, Knee     Culture, Body Fluid [[id number]] Collected: 09/24/24 0803    Order Status: No result Specimen: Body Fluid from Joint, Knee     Culture, Anaerobic and Aerobic [[id number]] Collected: 09/24/24 0803    Order Status: No result Specimen: Body Fluid from Joint, Knee     Culture, Fungus [[id number]] Collected: 09/24/24 0740    Order Status: Completed Specimen: Joint, Knee Updated: 09/25/24 1121     Specimen Description .KNEE, JOINT LEFT FLUID     Direct Exam NO FUNGAL ELEMENTS SEEN     Culture PENDING    Culture, Anaerobic and Aerobic [[id number]]  (Abnormal) Collected: 09/24/24 0740    Order Status: Completed Specimen: Joint, Knee Updated: 09/27/24 0807     Specimen Description .KNEE, JOINT LEFT FLUID     Direct Exam MANY NEUTROPHILS      MANY GRAM POSITIVE COCCI IN PAIRS     Culture STREPTOCOCCUS PNEUMONIAE MODERATE GROWTH For susceptibility, refer to previous culture. Identification by MALDI-TOF      No anaerobic organisms isolated at 3 days.    C.trachomatis N.gonorrhoeae DNA, Urine [[id number]] Collected: 09/23/24 1504    Order Status: Completed Specimen: Urine Updated: 09/24/24 1001     Specimen Description .URINE     C. trachomatis DNA ,Urine NEGATIVE     Comment: CHLAMYDIA TRACHOMATIS DNA not detected by nucleic acid amplification.        This test is intended for medical purposes only and is not valid for the evaluation of   suspected sexual abuse or for other forensic purposes.  In certain contexts, culture may be required to meet applicable laws and regulations for   diagnosis of C. trachomatis and N. gonorrhoeae infections.  Per 2014  CDC recommendations, this test does not include confirmation of positive results   by an alternative nucleic acid target.          N.

## 2024-10-31 NOTE — PROGRESS NOTES
Physician Progress Note      PATIENT:               SUSANNAH CROW  [facility] #:                  [id number]  :                       1980  ADMIT DATE:       2024 9:19 AM  DISCH DATE:        2024 12:16 PM  RESPONDING  PROVIDER #:        ESSENCE GRAHAM DO          QUERY TEXT:    Patient admitted with septic arthritis with synovitis of left knee.  Per Op   note dated  documentation of  Arthroscopic irrigation and debridement of   left knee septic arthritis.  To accurately reflect the procedure performed please document if debridement   was excisional or non-excisional and the deepest depth of tissue removed as   down to and including:    The medical record reflects the following:  Risk Factors: 44 yr old with septic arthritis of left knee  Clinical Indicators:  OP note: We then entered the medial joint. At this   time an 18 gauge spinal needle was used to determine the starting point for   the medial portal. #11 blade was used to make the incision and the blunt   obturator was used to enter the joint. Given the amount of synovitis present   anteriorly, we inserted our arthroscopic shaver to debride the synovitis.    Next the ACL and PCL were examined. Both were found to be intact, stable to   probing. There was synovitis present anteriorly which was debrided.    Once we had assessed the entirety of the knee joint, we continued to use our   shaver to debride synovitis and irrigate the knee. A total of 9 liters of   fluid were irrigated through the knee and at the end of irrigation and   debridement, there was no residual evidence of debris within the knee joint.    Treatment: Surgical intervention of Arthroscopic irrigation and debridement of   left knee septic arthritis, IV Rocephin, dc on po Levaquin  Options provided:  -- Nonexcisional debridement of left knee joint  -- Excisional debridement of left knee joint  -- Excisional debridement of bone of left knee  -- Nonexcisional debridement of

## 2024-11-08 ENCOUNTER — OFFICE VISIT (OUTPATIENT)
Dept: ORTHOPEDIC SURGERY | Age: 44
End: 2024-11-08

## 2024-11-08 VITALS — BODY MASS INDEX: 29.26 KG/M2 | HEIGHT: 72 IN | WEIGHT: 216 LBS

## 2024-11-08 DIAGNOSIS — M00.9 PYOGENIC ARTHRITIS OF LEFT KNEE JOINT, DUE TO UNSPECIFIED ORGANISM: Primary | ICD-10-CM

## 2024-11-08 PROCEDURE — 99024 POSTOP FOLLOW-UP VISIT: CPT | Performed by: STUDENT IN AN ORGANIZED HEALTH CARE EDUCATION/TRAINING PROGRAM

## 2024-11-08 NOTE — PROGRESS NOTES
[facility]  [address]  [place] [address]  Dept: [phone]  Dept Fax: [phone]        Orthopaedic Trauma Clinic Follow Up      Subjective:   Date of Surgery: 9/24/24, arthroscopic left knee I&D    Elizabet Wolff is a 44 y.o. year old male who presents to the clinic today for routine 6 week followup regarding his left knee arthroscopic I&D for septic arthritis. Patient's cultures grew strep pneumo. Was recently seen by ID 10/22/24 and placed on continued levofloxacin, CRP at that time was downtrending from 218-->103. Since then patient states his swelling to the knee has significantly improved, denies any fevers, chills. PT has not yet been approved by medicaid. He has now transitioned from walker to cane for ambulation.      Review of Systems  Gen: no fever, chills, malaise  CV: no chest pain or palpitations  Resp: no cough or shortness of breath  GI: no nausea, vomiting, diarrhea, or constipation  Neuro: no numbness, tingling, or weakness  Msk: left knee swelling  10 remaining systems reviewed and negative    Objective :   There were no vitals filed for this visit.Body mass index is 29.29 kg/m².  General: No acute distress, resting comfortably in the clinic  Neuro: Alert, oriented  Eyes: Extra-ocular muscles intact  Pulm: Respirations unlabored and regular.  Skin: Warm, well perfused  Psych: Patient has good fund of knowledge and displays understanging of exam, diagnosis, and plan.    MSK-LLE: Antalgic gait. Moderate left knee effusion. Skin incisions well healed. Active ROM 10-90 degrees. Very minimally TTP about the knee diffusely. Compartments soft and compressible. TA/EHL/FHL/GS motor intact. Deep and Superficial Peroneal/Saphenous/Sural SILT. Extremity warm and well perfused.       Radiology:  No new imaging taken today     Assessment:   44 y.o. year old male with left knee septic arthritis s/p arthroscopic I&D  Plan:

## 2024-11-19 ENCOUNTER — OFFICE VISIT (OUTPATIENT)
Dept: INFECTIOUS DISEASES | Age: 44
End: 2024-11-19

## 2024-11-19 VITALS
HEART RATE: 71 BPM | OXYGEN SATURATION: 100 % | WEIGHT: 215 LBS | TEMPERATURE: 97.2 F | BODY MASS INDEX: 29.12 KG/M2 | SYSTOLIC BLOOD PRESSURE: 116 MMHG | HEIGHT: 72 IN | RESPIRATION RATE: 19 BRPM | DIASTOLIC BLOOD PRESSURE: 62 MMHG

## 2024-11-19 DIAGNOSIS — M00.162: Primary | ICD-10-CM

## 2024-11-19 PROCEDURE — 99212 OFFICE O/P EST SF 10 MIN: CPT | Performed by: INTERNAL MEDICINE

## 2024-12-16 ENCOUNTER — TELEPHONE (OUTPATIENT)
Dept: ORTHOPEDIC SURGERY | Age: 44
End: 2024-12-16

## (undated) DEVICE — GOWN,SIRUS,NONRNF,SETINSLV,XL,20/CS: Brand: MEDLINE

## (undated) DEVICE — GOWN,SIRUS,NONRNF,SETINSLV,2XL,18/CS: Brand: MEDLINE

## (undated) DEVICE — DRAPE,U/ SHT,SPLIT,PLAS,STERIL: Brand: MEDLINE

## (undated) DEVICE — GLOVE ORANGE PI 8   MSG9080

## (undated) DEVICE — SUTURE ETHILON SZ 2-0 L18IN NONABSORBABLE BLK L26MM PS 3/8 585H

## (undated) DEVICE — STERLING XTRASHARP SHAVER GREAT WHITE SHAVER BLADE, 4.2 MM: Brand: STERLING XTRASHARP SHAVER GREAT WHITE

## (undated) DEVICE — MAT FLR ABSORBENT SM 40X28 IN 4.6 LT PNK LIQUI-LOC LF DISP

## (undated) DEVICE — BANDAGE,GAUZE,BULKEE II,4.5"X4.1YD,STRL: Brand: MEDLINE

## (undated) DEVICE — GLOVE ORTHO 8   MSG9480

## (undated) DEVICE — STRAP,POSITIONING,KNEE/BODY,FOAM,4X60": Brand: MEDLINE

## (undated) DEVICE — BANDAGE COMPR W6INXL15YD WHT BGE POLY COT WV E HK LOOP CLSR

## (undated) DEVICE — Device

## (undated) DEVICE — SOLUTION IRRIG 3000ML 0.9% SOD CHL USP UROMATIC PLAS CONT

## (undated) DEVICE — STRAP ARMBRD W1.5XL32IN FOAM STR YET SFT W/ HK AND LOOP

## (undated) DEVICE — THE STERILE LIGHT HANDLE COVER IS USED WITH STERIS SURGICAL LIGHTING AND VISUALIZATION SYSTEMS.

## (undated) DEVICE — APPLICATOR MEDICATED 26 CC SOLUTION HI LT ORNG CHLORAPREP

## (undated) DEVICE — SHEET,DRAPE,70X100,STERILE: Brand: MEDLINE

## (undated) DEVICE — 60-7070-106 TRNQT,DPSB,PLC BROWN: Brand: MEDLINE RENEWAL